# Patient Record
Sex: FEMALE | Race: WHITE | NOT HISPANIC OR LATINO | URBAN - METROPOLITAN AREA
[De-identification: names, ages, dates, MRNs, and addresses within clinical notes are randomized per-mention and may not be internally consistent; named-entity substitution may affect disease eponyms.]

---

## 2018-04-22 ENCOUNTER — EMERGENCY (EMERGENCY)
Facility: HOSPITAL | Age: 58
LOS: 1 days | Discharge: HOME | End: 2018-04-22
Attending: EMERGENCY MEDICINE
Payer: COMMERCIAL

## 2018-04-22 VITALS
OXYGEN SATURATION: 100 % | DIASTOLIC BLOOD PRESSURE: 60 MMHG | SYSTOLIC BLOOD PRESSURE: 160 MMHG | HEART RATE: 55 BPM | RESPIRATION RATE: 18 BRPM

## 2018-04-22 VITALS
RESPIRATION RATE: 16 BRPM | WEIGHT: 141.98 LBS | OXYGEN SATURATION: 100 % | HEIGHT: 62 IN | HEART RATE: 99 BPM | SYSTOLIC BLOOD PRESSURE: 161 MMHG | DIASTOLIC BLOOD PRESSURE: 61 MMHG | TEMPERATURE: 98 F

## 2018-04-22 PROCEDURE — 99283 EMERGENCY DEPT VISIT LOW MDM: CPT

## 2018-04-22 RX ORDER — HYDROCORTISONE 1 %
1 OINTMENT (GRAM) TOPICAL ONCE
Qty: 0 | Refills: 0 | Status: COMPLETED | OUTPATIENT
Start: 2018-04-22 | End: 2018-04-22

## 2018-04-22 RX ADMIN — Medication 1 APPLICATION(S): at 11:56

## 2018-04-22 NOTE — ED PROVIDER NOTE - LEFT FACE
periorbital erythema and swelling b/l, l>r, both supra- and infraorbital, but sparing area just underneath eyes b/l

## 2018-04-22 NOTE — ED PROVIDER NOTE - SKIN RASH DESCRIPTION
RAISED/REDDENED/firm, non-tender, non0--blanching erythema periorbitally on both sides, w scant overlying scale

## 2018-04-22 NOTE — ED PROVIDER NOTE - OBJECTIVE STATEMENT
59yo f w hx HTN, thyroid ca s/p partial thyroidectomy, here c/o 1 month rash around eyes. Pt says rash has been intermittent since onset. Not painful or pruritic. No fever. No eye pain, itchiness, or discharge. No sinus pressure, rhinorrhea, sore throat. Has also noted concomitant dryness of lips. No eye dryness. No known triggers. Pt recently went back to work at [employer] job and started wearing glasses made of plastic in same time frame as sx. Denies new products on face. Tried benadryl once yesterday w no relief. No new medications. No recent sun exposure. Has no SOB or GI sx. No muscle weakness, joint pain, or rash elsewhere. Has not seen any MD for this issue as of yet.

## 2018-04-22 NOTE — ED PROVIDER NOTE - MEDICAL DECISION MAKING DETAILS
58f here c/o 1 mo non-painful, non-pruritic periorbital rash w/o fever, intraocular, or URI sx. Low susp for periorbital cellulitis. Sx may be 2/2 contact dermatitis given recent introduction of glasses in similar timeframe, vs dermatomyositis or other rheum condition given distribution. Will give topical steroids and dc w rheum and derm f/u

## 2018-04-22 NOTE — ED PROVIDER NOTE - ATTENDING CONTRIBUTION TO CARE
Attending Note (Antonio): patient complaining of bilateral erythema surrounding both eyes. no vision changes.  erythema infra and supra orbital.  concern for inflammatory process. trial of topical hydrocortisone and outpatient rheum follow up

## 2018-04-22 NOTE — ED PROVIDER NOTE - PLAN OF CARE
You were seen in the emergency department for rash around your eyes. Please follow up with a rheumatologist and a dermatologist (contact info provided) in the next 5-7 days. Apply hydrocortisone cream to the affected area, making sure to avoid contact with eyes. Return to the emergency department immediately if you experience fever, pain w eye movements, headache, or any other concerning symptoms.

## 2018-04-22 NOTE — ED PROVIDER NOTE - CARE PLAN
Principal Discharge DX:	Rash  Assessment and plan of treatment:	You were seen in the emergency department for rash around your eyes. Please follow up with a rheumatologist and a dermatologist (contact info provided) in the next 5-7 days. Apply hydrocortisone cream to the affected area, making sure to avoid contact with eyes. Return to the emergency department immediately if you experience fever, pain w eye movements, headache, or any other concerning symptoms.

## 2019-05-03 ENCOUNTER — RESULT REVIEW (OUTPATIENT)
Age: 59
End: 2019-05-03

## 2020-08-02 ENCOUNTER — INPATIENT (INPATIENT)
Facility: HOSPITAL | Age: 60
LOS: 1 days | Discharge: HOME | End: 2020-08-04
Attending: INTERNAL MEDICINE | Admitting: INTERNAL MEDICINE
Payer: COMMERCIAL

## 2020-08-02 VITALS — HEART RATE: 29 BPM | RESPIRATION RATE: 16 BRPM

## 2020-08-02 DIAGNOSIS — E03.9 HYPOTHYROIDISM, UNSPECIFIED: ICD-10-CM

## 2020-08-02 DIAGNOSIS — I10 ESSENTIAL (PRIMARY) HYPERTENSION: ICD-10-CM

## 2020-08-02 DIAGNOSIS — I44.2 ATRIOVENTRICULAR BLOCK, COMPLETE: ICD-10-CM

## 2020-08-02 DIAGNOSIS — I45.9 CONDUCTION DISORDER, UNSPECIFIED: ICD-10-CM

## 2020-08-02 LAB
ALBUMIN SERPL ELPH-MCNC: 4.6 G/DL — SIGNIFICANT CHANGE UP (ref 3.3–5)
ALP SERPL-CCNC: 86 U/L — SIGNIFICANT CHANGE UP (ref 40–120)
ALT FLD-CCNC: 50 U/L — HIGH (ref 4–33)
ANION GAP SERPL CALC-SCNC: 20 MMO/L — HIGH (ref 7–14)
AST SERPL-CCNC: 33 U/L — HIGH (ref 4–32)
BASE EXCESS BLDV CALC-SCNC: -2.7 MMOL/L — SIGNIFICANT CHANGE UP
BASOPHILS # BLD AUTO: 0.06 K/UL — SIGNIFICANT CHANGE UP (ref 0–0.2)
BASOPHILS NFR BLD AUTO: 0.6 % — SIGNIFICANT CHANGE UP (ref 0–2)
BILIRUB SERPL-MCNC: 0.9 MG/DL — SIGNIFICANT CHANGE UP (ref 0.2–1.2)
BUN SERPL-MCNC: 20 MG/DL — SIGNIFICANT CHANGE UP (ref 7–23)
CALCIUM SERPL-MCNC: 9.4 MG/DL — SIGNIFICANT CHANGE UP (ref 8.4–10.5)
CHLORIDE SERPL-SCNC: 101 MMOL/L — SIGNIFICANT CHANGE UP (ref 98–107)
CO2 SERPL-SCNC: 17 MMOL/L — LOW (ref 22–31)
CREAT SERPL-MCNC: 1.01 MG/DL — SIGNIFICANT CHANGE UP (ref 0.5–1.3)
EOSINOPHIL # BLD AUTO: 0.13 K/UL — SIGNIFICANT CHANGE UP (ref 0–0.5)
EOSINOPHIL NFR BLD AUTO: 1.3 % — SIGNIFICANT CHANGE UP (ref 0–6)
GAS PNL BLDV: 140 MMOL/L — SIGNIFICANT CHANGE UP (ref 136–146)
GLUCOSE BLDV-MCNC: 165 MG/DL — HIGH (ref 70–99)
GLUCOSE SERPL-MCNC: 172 MG/DL — HIGH (ref 70–99)
HCO3 BLDV-SCNC: 22 MMOL/L — SIGNIFICANT CHANGE UP (ref 20–27)
HCT VFR BLD CALC: 39.9 % — SIGNIFICANT CHANGE UP (ref 34.5–45)
HCT VFR BLDV CALC: 43.5 % — SIGNIFICANT CHANGE UP (ref 34.5–45)
HGB BLD-MCNC: 13.6 G/DL — SIGNIFICANT CHANGE UP (ref 11.5–15.5)
HGB BLDV-MCNC: 14.2 G/DL — SIGNIFICANT CHANGE UP (ref 11.5–15.5)
IMM GRANULOCYTES NFR BLD AUTO: 0.5 % — SIGNIFICANT CHANGE UP (ref 0–1.5)
LYMPHOCYTES # BLD AUTO: 1.71 K/UL — SIGNIFICANT CHANGE UP (ref 1–3.3)
LYMPHOCYTES # BLD AUTO: 16.9 % — SIGNIFICANT CHANGE UP (ref 13–44)
MAGNESIUM SERPL-MCNC: 2.1 MG/DL — SIGNIFICANT CHANGE UP (ref 1.6–2.6)
MCHC RBC-ENTMCNC: 29.5 PG — SIGNIFICANT CHANGE UP (ref 27–34)
MCHC RBC-ENTMCNC: 34.1 % — SIGNIFICANT CHANGE UP (ref 32–36)
MCV RBC AUTO: 86.6 FL — SIGNIFICANT CHANGE UP (ref 80–100)
MONOCYTES # BLD AUTO: 0.68 K/UL — SIGNIFICANT CHANGE UP (ref 0–0.9)
MONOCYTES NFR BLD AUTO: 6.7 % — SIGNIFICANT CHANGE UP (ref 2–14)
NEUTROPHILS # BLD AUTO: 7.5 K/UL — HIGH (ref 1.8–7.4)
NEUTROPHILS NFR BLD AUTO: 74 % — SIGNIFICANT CHANGE UP (ref 43–77)
NRBC # FLD: 0 K/UL — SIGNIFICANT CHANGE UP (ref 0–0)
PCO2 BLDV: 28 MMHG — LOW (ref 41–51)
PH BLDV: 7.48 PH — HIGH (ref 7.32–7.43)
PHOSPHATE SERPL-MCNC: 2.1 MG/DL — LOW (ref 2.5–4.5)
PLATELET # BLD AUTO: 289 K/UL — SIGNIFICANT CHANGE UP (ref 150–400)
PMV BLD: 10.3 FL — SIGNIFICANT CHANGE UP (ref 7–13)
PO2 BLDV: 27 MMHG — LOW (ref 35–40)
POTASSIUM BLDV-SCNC: 3.7 MMOL/L — SIGNIFICANT CHANGE UP (ref 3.4–4.5)
POTASSIUM SERPL-MCNC: 3.6 MMOL/L — SIGNIFICANT CHANGE UP (ref 3.5–5.3)
POTASSIUM SERPL-SCNC: 3.6 MMOL/L — SIGNIFICANT CHANGE UP (ref 3.5–5.3)
PROT SERPL-MCNC: 6.8 G/DL — SIGNIFICANT CHANGE UP (ref 6–8.3)
RBC # BLD: 4.61 M/UL — SIGNIFICANT CHANGE UP (ref 3.8–5.2)
RBC # FLD: 12.7 % — SIGNIFICANT CHANGE UP (ref 10.3–14.5)
SAO2 % BLDV: 54.5 % — LOW (ref 60–85)
SARS-COV-2 RNA SPEC QL NAA+PROBE: SIGNIFICANT CHANGE UP
SODIUM SERPL-SCNC: 138 MMOL/L — SIGNIFICANT CHANGE UP (ref 135–145)
TROPONIN T, HIGH SENSITIVITY: 37 NG/L — SIGNIFICANT CHANGE UP (ref ?–14)
TSH SERPL-MCNC: 1.48 UIU/ML — SIGNIFICANT CHANGE UP (ref 0.27–4.2)
WBC # BLD: 10.13 K/UL — SIGNIFICANT CHANGE UP (ref 3.8–10.5)
WBC # FLD AUTO: 10.13 K/UL — SIGNIFICANT CHANGE UP (ref 3.8–10.5)

## 2020-08-02 PROCEDURE — 99291 CRITICAL CARE FIRST HOUR: CPT

## 2020-08-02 PROCEDURE — 71045 X-RAY EXAM CHEST 1 VIEW: CPT | Mod: 26

## 2020-08-02 RX ORDER — POTASSIUM CHLORIDE 20 MEQ
40 PACKET (EA) ORAL ONCE
Refills: 0 | Status: COMPLETED | OUTPATIENT
Start: 2020-08-02 | End: 2020-08-02

## 2020-08-02 RX ORDER — POTASSIUM PHOSPHATE, MONOBASIC POTASSIUM PHOSPHATE, DIBASIC 236; 224 MG/ML; MG/ML
15 INJECTION, SOLUTION INTRAVENOUS ONCE
Refills: 0 | Status: COMPLETED | OUTPATIENT
Start: 2020-08-02 | End: 2020-08-02

## 2020-08-02 RX ORDER — RAMIPRIL 5 MG
1 CAPSULE ORAL
Qty: 0 | Refills: 0 | DISCHARGE

## 2020-08-02 RX ORDER — CHLORHEXIDINE GLUCONATE 213 G/1000ML
1 SOLUTION TOPICAL DAILY
Refills: 0 | Status: DISCONTINUED | OUTPATIENT
Start: 2020-08-02 | End: 2020-08-04

## 2020-08-02 RX ORDER — LEVOTHYROXINE SODIUM 125 MCG
1 TABLET ORAL
Qty: 0 | Refills: 0 | DISCHARGE

## 2020-08-02 RX ORDER — MAGNESIUM SULFATE 500 MG/ML
2 VIAL (ML) INJECTION ONCE
Refills: 0 | Status: COMPLETED | OUTPATIENT
Start: 2020-08-02 | End: 2020-08-02

## 2020-08-02 RX ORDER — METOCLOPRAMIDE HCL 10 MG
10 TABLET ORAL ONCE
Refills: 0 | Status: COMPLETED | OUTPATIENT
Start: 2020-08-02 | End: 2020-08-02

## 2020-08-02 RX ADMIN — Medication 40 MILLIEQUIVALENT(S): at 22:09

## 2020-08-02 RX ADMIN — Medication 50 GRAM(S): at 18:54

## 2020-08-02 RX ADMIN — POTASSIUM PHOSPHATE, MONOBASIC POTASSIUM PHOSPHATE, DIBASIC 62.5 MILLIMOLE(S): 236; 224 INJECTION, SOLUTION INTRAVENOUS at 22:09

## 2020-08-02 RX ADMIN — Medication 10 MILLIGRAM(S): at 19:26

## 2020-08-02 NOTE — H&P ADULT - HISTORY OF PRESENT ILLNESS
Patient is a 59 yo F with PMH of complete L bundle branch block, HTN and hypothyroid presenting with 1 day of SOB and dizzyness. Patient states that yesterday she was feeling fatigued after a walk. This morning, patient reports having headaches. In the early afternoon patient began feeling light headed and dizzy, nauseous, and had first episode of NBNB vomiting and diarrhea. Patient's  reported that she fell asleep and had trouble waking her up. Patient began making gurgling sounds and "seizure like motions" and had another episode of diarrhea and vomit. Currently, the patient endorses SOB and "shakiness."    In the ED vital signs were HR 29, RR 16. Patient was given magnesium sulfate IVPB 2 g.

## 2020-08-02 NOTE — H&P ADULT - NSHPPHYSICALEXAM_GEN_ALL_CORE
VITAL SIGNS: I have reviewed nursing notes and confirm.  CONSTITUTIONAL: Well-developed; well-nourished; somnolent, in no acute distress  SKIN: Skin exam is warm and dry, no acute rash.  HEAD: Normocephalic; atraumatic.  EYES: PERRL, EOM intact; conjunctiva and sclera clear.  ENT: No nasal discharge; airway clear. TMs clear.  NECK: Supple; non tender.  CARD: complete heart block with PVC and episodes of VT in emergency room   RESP: No wheezes, rales or rhonchi.  ABD: Normal bowel sounds; soft; non-distended; non-tender; no hepatosplenomegaly.  EXT: Normal ROM. No clubbing, cyanosis or edema.  LYMPH: No acute cervical adenopathy.  NEURO: Alert, oriented. Grossly unremarkable. No focal deficits.  PSYCH: Cooperative, appropriate.

## 2020-08-02 NOTE — H&P ADULT - NSICDXPASTMEDICALHX_GEN_ALL_CORE_FT
PAST MEDICAL HISTORY:  H/O left bundle branch block diagnosed 7-10 years ago    Hypertension     Hypothyroid

## 2020-08-02 NOTE — H&P ADULT - NSHPLABSRESULTS_GEN_ALL_CORE
LABS: Personally reviewed labs, imaging, and ECG                          13.6   10.13 )-----------( 289      ( 02 Aug 2020 17:35 )             39.9       08-02    138  |  101  |  20  ----------------------------<  172<H>  3.6   |  17<L>  |  1.01    Ca    9.4      02 Aug 2020 17:35  Phos  2.1     08-02  Mg     2.1     08-02    TPro  6.8  /  Alb  4.6  /  TBili  0.9  /  DBili  x   /  AST  33<H>  /  ALT  50<H>  /  AlkPhos  86  08-02       LIVER FUNCTIONS - ( 02 Aug 2020 17:35 )  Alb: 4.6 g/dL / Pro: 6.8 g/dL / ALK PHOS: 86 u/L / ALT: 50 u/L / AST: 33 u/L / GGT: x                            Lactate Trend            CAPILLARY BLOOD GLUCOSE                RADIOLOGY & ADDITIONAL TESTS:

## 2020-08-02 NOTE — H&P ADULT - ASSESSMENT
60 year old female with PMH of L bundle branch block, HTN, and hypothyroid diagnosed with complete bundle branch block in ED.

## 2020-08-02 NOTE — ED PROVIDER NOTE - PHYSICAL EXAMINATION
Gen: diaphoretic, nauseous  Head: NCAT  HEENT: PERRL, MMM, normal conjunctiva, anicteric, neck supple  Lung: CTAB, no adventitious sounds  CV: bradycardic  Abd: soft, NTND, no rebound or guarding, no CVAT  MSK: No edema, no visible deformities  Neuro: No focal neurologic deficits. CN II-XII grossly intact. 5/5 strength and normal sensation in all extremities.  Skin: Warm and dry, no evidence of rash  Psych: normal mood and affect

## 2020-08-02 NOTE — H&P ADULT - PROBLEM SELECTOR PLAN 1
- s/p transvenous pacing wire placement in emergency room, setting rate of 60, MA 10, threshold of 0.8  - NPO past MN  - stat TTE in am  - likely BiV pacemaker

## 2020-08-02 NOTE — H&P ADULT - NSHPREVIEWOFSYSTEMS_GEN_ALL_CORE
CONSTITUTIONAL: +chills, No weakness, no fevers   EYES/ENT: No visual changes;  No vertigo or throat pain   NECK: No pain or stiffness  RESPIRATORY: + SOB, No cough, wheezing, hemoptysis  CARDIOVASCULAR: No chest pain or palpitations  GASTROINTESTINAL: +N/V/D, No abdominal pain; No hemetemesis, melena or hematochezia.  GENITOURINARY: No dysuria, frequency or hematuria  NEUROLOGICAL: +headache, +dizzyness, No numbness or weakness  SKIN: No itching, burning, rashes, or lesions   All other review of systems is negative unless indicated above.

## 2020-08-02 NOTE — ED PROVIDER NOTE - OBJECTIVE STATEMENT
61yo F with hx of heart block, bradycardia presents today with dizziness. Had syncopal event. Pt too dizzy to give accurate history.

## 2020-08-02 NOTE — ED ADULT NURSE REASSESSMENT NOTE - NS ED NURSE REASSESS COMMENT FT1
MD Corona/Darrell @ bedside for pacemaker placement, Natividad (rn Fac) @ bedside, pt remains on zoll/cm for monitoring, VS as noted, awaiting CCU ADM/bed, will continue to monitor.

## 2020-08-02 NOTE — ED PROVIDER NOTE - ATTENDING CONTRIBUTION TO CARE
Awake, Alert, Conversant.  Seated in stretcher.  Breath sounds clear in all lung fields.  Bradycardic regular heart rate without murmurs, rubs, or gallops.  Normal S1/S2.  Cool extremities.  Abdomen soft and nontender.  No lower extremity swelling or tenderness.  Oriented and conversant with fluent speech, moving all extremities with good strength.    Dr. Palma: I agree with the above provided history and exam and addend/modify it as follows.  60F hx Hypertension, S/P thyroidectomy, known left bundle branch block Presents with nausea, vomiting (non-bloody/non-bilious) and generalized weakness onset at 2-3 pm constant since onset.  No clear provoking or palliating factors. No chest pain.  No bloody or melenic stools.  No recent fever or chills.  On arrival on complete heart block with hypotension, and cool extremities consistent with cardiogenic shock in the setting of bradycardia.  EKG demonstrates known LBBB, negative by Sgarbossa's criteria.  Telemetry demonstrates runs of NSVT consistent with Joey T phenomenon. Given evidence of shock will place TVP.  No recent rashes or travel but lyme disease is considered.  No evidence of other infectious etiology.  Possible metabolic derangement, hypothyroidism or electrolyte abnormality.  R/O MI.    Plan for labs, TVP, CXR, cardiology consulted and at bedside.  Anticipate [facility] admission.    DEMI Palma MD performed a history and physical exam of the patient and discussed their management with the resident and /or advanced care provider. I reviewed the resident and /or ACP's note and agree with the documented findings and plan of care. My medical decision making and observations are found above.  I spent 34 minutes of critical care time excluding procedures on the evaluation, decision-making, and management of this patient and personally reviewed pertinent imaging, labs, and supporting documentation.

## 2020-08-02 NOTE — ED PROCEDURE NOTE - ATTENDING CONTRIBUTION TO CARE
DEMI Palam MD was present for the entirety of the procedure which was tell tolerated and without complications.

## 2020-08-03 ENCOUNTER — TRANSCRIPTION ENCOUNTER (OUTPATIENT)
Age: 60
End: 2020-08-03

## 2020-08-03 DIAGNOSIS — Z98.890 OTHER SPECIFIED POSTPROCEDURAL STATES: Chronic | ICD-10-CM

## 2020-08-03 PROBLEM — I10 ESSENTIAL (PRIMARY) HYPERTENSION: Chronic | Status: ACTIVE | Noted: 2018-04-22

## 2020-08-03 LAB
ALBUMIN SERPL ELPH-MCNC: 4.5 G/DL — SIGNIFICANT CHANGE UP (ref 3.3–5)
ALP SERPL-CCNC: 93 U/L — SIGNIFICANT CHANGE UP (ref 40–120)
ALT FLD-CCNC: 43 U/L — HIGH (ref 4–33)
ANION GAP SERPL CALC-SCNC: 17 MMO/L — HIGH (ref 7–14)
APTT BLD: 30.2 SEC — SIGNIFICANT CHANGE UP (ref 27–36.3)
AST SERPL-CCNC: 32 U/L — SIGNIFICANT CHANGE UP (ref 4–32)
BILIRUB SERPL-MCNC: 1.3 MG/DL — HIGH (ref 0.2–1.2)
BUN SERPL-MCNC: 14 MG/DL — SIGNIFICANT CHANGE UP (ref 7–23)
CALCIUM SERPL-MCNC: 9.7 MG/DL — SIGNIFICANT CHANGE UP (ref 8.4–10.5)
CHLORIDE SERPL-SCNC: 103 MMOL/L — SIGNIFICANT CHANGE UP (ref 98–107)
CO2 SERPL-SCNC: 19 MMOL/L — LOW (ref 22–31)
CREAT SERPL-MCNC: 0.72 MG/DL — SIGNIFICANT CHANGE UP (ref 0.5–1.3)
GLUCOSE SERPL-MCNC: 114 MG/DL — HIGH (ref 70–99)
HCT VFR BLD CALC: 45.5 % — HIGH (ref 34.5–45)
HGB BLD-MCNC: 14.9 G/DL — SIGNIFICANT CHANGE UP (ref 11.5–15.5)
INR BLD: 1.07 — SIGNIFICANT CHANGE UP (ref 0.88–1.17)
MAGNESIUM SERPL-MCNC: 2.6 MG/DL — SIGNIFICANT CHANGE UP (ref 1.6–2.6)
MCHC RBC-ENTMCNC: 28.7 PG — SIGNIFICANT CHANGE UP (ref 27–34)
MCHC RBC-ENTMCNC: 32.7 % — SIGNIFICANT CHANGE UP (ref 32–36)
MCV RBC AUTO: 87.5 FL — SIGNIFICANT CHANGE UP (ref 80–100)
NRBC # FLD: 0 K/UL — SIGNIFICANT CHANGE UP (ref 0–0)
PHOSPHATE SERPL-MCNC: 3.5 MG/DL — SIGNIFICANT CHANGE UP (ref 2.5–4.5)
PLATELET # BLD AUTO: 269 K/UL — SIGNIFICANT CHANGE UP (ref 150–400)
PMV BLD: 9.8 FL — SIGNIFICANT CHANGE UP (ref 7–13)
POTASSIUM SERPL-MCNC: 4.4 MMOL/L — SIGNIFICANT CHANGE UP (ref 3.5–5.3)
POTASSIUM SERPL-SCNC: 4.4 MMOL/L — SIGNIFICANT CHANGE UP (ref 3.5–5.3)
PROT SERPL-MCNC: 7.3 G/DL — SIGNIFICANT CHANGE UP (ref 6–8.3)
PROTHROM AB SERPL-ACNC: 12.3 SEC — SIGNIFICANT CHANGE UP (ref 9.8–13.1)
RBC # BLD: 5.2 M/UL — SIGNIFICANT CHANGE UP (ref 3.8–5.2)
RBC # FLD: 12.6 % — SIGNIFICANT CHANGE UP (ref 10.3–14.5)
SODIUM SERPL-SCNC: 139 MMOL/L — SIGNIFICANT CHANGE UP (ref 135–145)
TROPONIN T, HIGH SENSITIVITY: 71 NG/L — CRITICAL HIGH (ref ?–14)
WBC # BLD: 11.18 K/UL — HIGH (ref 3.8–10.5)
WBC # FLD AUTO: 11.18 K/UL — HIGH (ref 3.8–10.5)

## 2020-08-03 PROCEDURE — 71045 X-RAY EXAM CHEST 1 VIEW: CPT | Mod: 26

## 2020-08-03 PROCEDURE — 93306 TTE W/DOPPLER COMPLETE: CPT | Mod: 26

## 2020-08-03 PROCEDURE — 93454 CORONARY ARTERY ANGIO S&I: CPT | Mod: 26

## 2020-08-03 PROCEDURE — 33225 L VENTRIC PACING LEAD ADD-ON: CPT

## 2020-08-03 PROCEDURE — 33208 INSRT HEART PM ATRIAL & VENT: CPT

## 2020-08-03 PROCEDURE — 99233 SBSQ HOSP IP/OBS HIGH 50: CPT

## 2020-08-03 RX ORDER — VANCOMYCIN HCL 1 G
1000 VIAL (EA) INTRAVENOUS ONCE
Refills: 0 | Status: COMPLETED | OUTPATIENT
Start: 2020-08-04 | End: 2020-08-04

## 2020-08-03 RX ADMIN — CHLORHEXIDINE GLUCONATE 1 APPLICATION(S): 213 SOLUTION TOPICAL at 11:27

## 2020-08-03 NOTE — DISCHARGE NOTE PROVIDER - CARE PROVIDER_API CALL
Luis Nick  CARDIAC ELECTROPHYSIOLOGY  [address] [place] [address]  Phone: [phone]  Fax: [phone]  Follow Up Time: Luis Nick  CARDIAC ELECTROPHYSIOLOGY  [address] [address] [place] [id number]  Phone: [phone]  Fax: [phone]  Follow Up Time:     Willard Baig  CARDIOVASCULAR DISEASE  [address] [address]  Phone: [phone]  Fax: [phone]  Established Patient  Follow Up Time: 1 week

## 2020-08-03 NOTE — DISCHARGE NOTE PROVIDER - CARE PROVIDERS DIRECT ADDRESSES
[email or web address]

## 2020-08-03 NOTE — DISCHARGE NOTE PROVIDER - NSDCMRMEDTOKEN_GEN_ALL_CORE_FT
ramipril 2.5 mg oral tablet: 1  orally  Synthroid 50 mcg (0.05 mg) oral tablet: 1 tab(s) orally once a day carvedilol 3.125 mg oral tablet: 1 tab(s) orally every 12 hours  ramipril 2.5 mg oral tablet: 1  orally  Synthroid 50 mcg (0.05 mg) oral tablet: 1 tab(s) orally once a day

## 2020-08-03 NOTE — DISCHARGE NOTE PROVIDER - HOSPITAL COURSE
Mrs. Borjas is a 61 yo F w/ PMH of LBBB (diagnosed 10 years ago), hypertension, and hypothyroid (s/p thyroid surgery); p/w worsening fatigue, N/V/D, SOB, and bradycardia (29 admitted with CHB with wide and slow escape rhythm. In ER, a transvenous pacing wire was inserted VVIof 60. Patient is awaiting a permanent pacemker.     She had a rise in troponin that is being closely monitored. Mrs. Borjas is a 61 yo F w/ PMH of LBBB (diagnosed 10 years ago), hypertension, and hypothyroid (s/p thyroid surgery); p/w worsening fatigue, N/V/D, SOB, and bradycardia (29 admitted with CHB with wide and slow escape rhythm. In ER, a transvenous pacing wire was inserted VVIof 60. Ms. Borjas underwent a TTE which was notable for mild segmental LV dysfunction with hypokinesia of the inferior wall (EF 50%), concerning as this may place patient at a predisposition toward heart failure. Ms. Borjas may benefit from a follow-up echocardiogram for further assessment. On the day of discharge, patient was started on carvedilol 3.125 q12, which she tolerated well.        Cardiac catheterization revealed NCA. S/P BIVPM implantation yesterday, without complication. Mrs. Borjas is a 61 yo F w/ PMH of LBBB (diagnosed 10 years ago), hypertension, and hypothyroid (s/p thyroid surgery); p/w worsening fatigue, N/V/D, SOB, and bradycardia (29 admitted with CHB with wide and slow escape rhythm. In ER, a transvenous pacing wire was inserted VVIof 60. Ms. Borjas underwent a TTE which was notable for mild segmental LV dysfunction with hypokinesia of the inferior wall (EF 50%), concerning as this may place patient at a predisposition toward heart failure. Ms. Borjas may benefit from a follow-up echocardiogram for further assessment. On the day of discharge, patient was started on carvedilol 3.125 q12, which was well tolerated. Cardiac catheterization revealed NCA. Ms Borjas underwent BIVPM implantation on 8/3/2020, without complication. Of note, Ms. Borjas's troponin was elevated on 8/3/2020 at 71 (from 37). At discharge on 8/4/2020, troponin was 46.        On day of discharge, Ms. Borjas had no complaints (other than residual pain at incision site), her vital signs were stable, and labs overall stable and not concerning.

## 2020-08-03 NOTE — CONSULT NOTE ADULT - ASSESSMENT
61 yo F w/ PMH of LBBB (diagnosed 10 years ago), hypertension, and hypothyroid (s/p thyroid surgery); p/w worsening fatigue, N/V/D, SOB, and bradycardia (29 bpm, s/p TVP); admitted with CHB with wide and slow escape rhythm.    #CHB  -known LBBB, admitted w/ CHB  -s/p transvenous pacing wire placement in ED (rate 60, MA 10, threshold 0.8)  -planning for BIV pacemaker this afternoon. Patient consented for the procedure after understanding risks/benefits and alternatives of theprocedure      #Elevated troponin   -71 today (from 37)  - Echocardiogram prelim report shows mild LV dysfunction and inferior wall hypokinesis  - Plan for possible cardiac catheterization prior to PPM placement  - Discussed with Dr. Nick

## 2020-08-03 NOTE — CONSULT NOTE ADULT - ATTENDING COMMENTS
61 yo F w/ PMH of LBBB (diagnosed 10 years ago), hypertension, and hypothyroid (s/p thyroid surgery); p/w worsening fatigue, N/V/D, SOB, and bradycardia (29 bpm, s/p TVP); admitted with CHB with LBBB- wide and slow escape rhythm. Temp wire in place. EF: 50%, inf hypokinesis- plan for cath and BiV PPM.

## 2020-08-03 NOTE — CONSULT NOTE ADULT - SUBJECTIVE AND OBJECTIVE BOX
CHIEF COMPLAINT: Called to evaluate patient with complete heart block for PPM implantation    HISTORY OF PRESENT ILLNESS:  Patient is a 61 yo F with PMH of L bundle branch block, HTN and hypothyroidism presented with 1 day of SOB and dizziness. Patient reports feeling fatigued after a walk yesterday. She also had c/o headaches. In the early afternoon patient began feeling light headed and dizzy, nauseous, and had first episode of NBNB vomiting and diarrhea. Patient's  reported that she fell asleep and had trouble waking her up. Patient began making gurgling sounds and "seizure like motions" and had another episode of diarrhea and vomit. Patient was found to have HR 29. Patient was given magnesium sulfate IVPB 2 g. EKG revealed CHB with wide and slow escape rhythm and TVP was placed. Currently telemetry reveals sinus rhythm/junctional rhythm with V pacing and patient denies any chest pain, SOB, palpitations or dizziness.       PAST MEDICAL & SURGICAL HISTORY:  H/O left bundle branch block: diagnosed 7-10 years ago  Hypothyroid  Hypertension  History of thyroid surgery: Patient suffers from nausea with anesthesia    PREVIOUS DIAGNOSTIC TESTING:    Echocardiogram:   Catheterization:  Stress Test:  	    MEDICATIONS:              chlorhexidine 4% Liquid 1 Application(s) Topical daily      FAMILY HISTORY:      SOCIAL HISTORY:      [ ] Smoker  [ ] Alcohol  [ ] Drugs    Allergies    Keflex (Nausea)  penicillin (Rash)    Intolerances    	    REVIEW OF SYSTEMS:  CONSTITUTIONAL: No fever, weight loss, or fatigue  EYES: No eye pain, visual disturbances, or discharge  ENMT:  No difficulty hearing, tinnitus, vertigo; No sinus or throat pain  NECK: No pain or stiffness  RESPIRATORY: No cough, wheezing, chills or hemoptysis; No Shortness of Breath  CARDIOVASCULAR: No chest pain, palpitations, passing out, dizziness, or leg swelling  GASTROINTESTINAL: No abdominal or epigastric pain. No nausea, vomiting, or hematemesis; No diarrhea or constipation. No melena or hematochezia.  GENITOURINARY: No dysuria, frequency, hematuria, or incontinence  NEUROLOGICAL: No headaches, memory loss, loss of strength, numbness, or tremors  SKIN: No itching, burning, rashes, or lesions   LYMPH Nodes: No enlarged glands  ENDOCRINE: No heat or cold intolerance; No hair loss  MUSCULOSKELETAL: No joint pain or swelling; No muscle, back, or extremity pain  PSYCHIATRIC: No depression, anxiety, mood swings, or difficulty sleeping  HEME/LYMPH: No easy bruising, or bleeding gums  ALLERY AND IMMUNOLOGIC: No hives or eczema	    [ ] All others negative	  [ ] Unable to obtain    PHYSICAL EXAM:  T(C): 36.8 (08-03-20 @ 07:55), Max: 36.8 (08-03-20 @ 07:55)  HR: 65 (08-03-20 @ 12:00) (29 - 73)  BP: 146/68 (08-03-20 @ 12:00) (112/90 - 177/70)  RR: 17 (08-03-20 @ 12:00) (14 - 22)  SpO2: 100% (08-03-20 @ 12:00) (98% - 100%)  Wt(kg): --  I&O's Summary    02 Aug 2020 07:01  -  03 Aug 2020 07:00  --------------------------------------------------------  IN: 250 mL / OUT: 2450 mL / NET: -2200 mL    03 Aug 2020 07:01  -  03 Aug 2020 13:42  --------------------------------------------------------  IN: 0 mL / OUT: 0 mL / NET: 0 mL        Appearance: Normal	  HEENT:   Normal oral mucosa, PERRL, EOMI	  Lymphatic: No lymphadenopathy  Cardiovascular: Normal S1 S2, No JVD, No murmurs, No edema  Respiratory: Lungs clear to auscultation	  Psychiatry: A & O x 3, Mood & affect appropriate  Gastrointestinal:  Soft, Non-tender, + BS	  Skin: No rashes, No ecchymoses, No cyanosis	  Neurologic: Non-focal  Extremities: Normal range of motion, No clubbing, cyanosis or edema  Vascular: Peripheral pulses palpable 2+ bilaterally    TELEMETRY: 	    ECG:  	  RADIOLOGY:  OTHER: 	  	  LABS:	 	    CARDIAC MARKERS:                                  14.9   11.18 )-----------( 269      ( 03 Aug 2020 04:00 )             45.5     08-03    139  |  103  |  14  ----------------------------<  114<H>  4.4   |  19<L>  |  0.72    Ca    9.7      03 Aug 2020 04:00  Phos  3.5     08-03  Mg     2.6     08-03    TPro  7.3  /  Alb  4.5  /  TBili  1.3<H>  /  DBili  x   /  AST  32  /  ALT  43<H>  /  AlkPhos  93  08-03    proBNP:   Lipid Profile:   HgA1c:   TSH: Thyroid Stimulating Hormone, Serum: 1.48 uIU/mL (08-02 @ 17:49) CHIEF COMPLAINT: Called to evaluate patient with complete heart block for PPM implantation    HISTORY OF PRESENT ILLNESS:  Patient is a 61 yo F with PMH of L bundle branch block, HTN and hypothyroidism presented with 1 day of SOB and dizziness. Patient reports feeling fatigued after a walk yesterday. She also had c/o headaches. In the early afternoon patient began feeling light headed and dizzy, nauseous, and had first episode of NBNB vomiting and diarrhea. Patient's  reported that she fell asleep and had trouble waking her up. Patient began making gurgling sounds and "seizure like motions" and had another episode of diarrhea and vomit. Patient was found to have HR 29. Patient was given magnesium sulfate IVPB 2 g. EKG revealed CHB with wide and slow escape rhythm and TVP was placed. Currently telemetry reveals sinus rhythm/junctional rhythm with V pacing and patient denies any chest pain, SOB, palpitations or dizziness.       PAST MEDICAL & SURGICAL HISTORY:  H/O left bundle branch block: diagnosed 7-10 years ago  Hypothyroid  Hypertension  History of thyroid surgery: Patient suffers from nausea with anesthesia    PREVIOUS DIAGNOSTIC TESTING:    Echocardiogram: Prelim report: Inferior wall hypokinesis, mild LV dysfunction   Catheterization: Pending  	    MEDICATIONS:  chlorhexidine 4% Liquid 1 Application(s) Topical daily      FAMILY HISTORY:  None significant      SOCIAL HISTORY:      [-] Smoker  [-] Alcohol  [-] Drugs    Allergies    Keflex (Nausea)  penicillin (Rash)    Intolerances    	    REVIEW OF SYSTEMS:  CONSTITUTIONAL: No fever, weight loss, or fatigue  EYES: No eye pain, visual disturbances, or discharge  ENMT:  No difficulty hearing, tinnitus, vertigo; No sinus or throat pain  NECK: No pain or stiffness  RESPIRATORY: No cough, wheezing, chills or hemoptysis; + Shortness of Breath  CARDIOVASCULAR: No chest pain, palpitations, passing out, +dizziness  GASTROINTESTINAL: No abdominal or epigastric pain. No nausea, vomiting, or hematemesis; No diarrhea or constipation. No melena or hematochezia.  GENITOURINARY: No dysuria, frequency, hematuria, or incontinence  NEUROLOGICAL: No headaches, memory loss, loss of strength, numbness, or tremors  SKIN: No itching, burning, rashes, or lesions   LYMPH Nodes: No enlarged glands  ENDOCRINE: No heat or cold intolerance; No hair loss  MUSCULOSKELETAL: No joint pain or swelling; No muscle, back, or extremity pain  PSYCHIATRIC: No depression, anxiety, mood swings, or difficulty sleeping  HEME/LYMPH: No easy bruising, or bleeding gums  ALLERY AND IMMUNOLOGIC: No hives or eczema	    [X] All others negative	  [ ] Unable to obtain    PHYSICAL EXAM:  T(C): 36.8 (08-03-20 @ 07:55), Max: 36.8 (08-03-20 @ 07:55)  HR: 65 (08-03-20 @ 12:00) (29 - 73)  BP: 146/68 (08-03-20 @ 12:00) (112/90 - 177/70)  RR: 17 (08-03-20 @ 12:00) (14 - 22)  SpO2: 100% (08-03-20 @ 12:00) (98% - 100%)  Wt(kg): --  I&O's Summary    02 Aug 2020 07:01  -  03 Aug 2020 07:00  --------------------------------------------------------  IN: 250 mL / OUT: 2450 mL / NET: -2200 mL    03 Aug 2020 07:01  -  03 Aug 2020 13:42  --------------------------------------------------------  IN: 0 mL / OUT: 0 mL / NET: 0 mL        Appearance: Normal	  HEENT:   Normal oral mucosa, PERRL, EOMI	  Lymphatic: No lymphadenopathy  Cardiovascular: Normal S1 S2, No JVD, No murmurs, No edema  Respiratory: Lungs clear to auscultation	  Psychiatry: A & O x 3, Mood & affect appropriate  Gastrointestinal:  Soft, Non-tender, + BS	  Skin: No rashes, No ecchymoses, No cyanosis	  Neurologic: Non-focal  Extremities: Normal range of motion, No clubbing, cyanosis or edema  Vascular: Peripheral pulses palpable 2+ bilaterally    TELEMETRY: Sinus rhythm/junctional rhythm with V pacing @ 60    ECG: CHB with wide ventricular escape @ 29 bpm 	  RADIOLOGY:  OTHER: 	  	  LABS:	 	    CARDIAC MARKERS:                     14.9   11.18 )-----------( 269      ( 03 Aug 2020 04:00 )             45.5     08-03    139  |  103  |  14  ----------------------------<  114<H>  4.4   |  19<L>  |  0.72    Ca    9.7      03 Aug 2020 04:00  Phos  3.5     08-03  Mg     2.6     08-03    TPro  7.3  /  Alb  4.5  /  TBili  1.3<H>  /  DBili  x   /  AST  32  /  ALT  43<H>  /  AlkPhos  93  08-03    proBNP:   Lipid Profile:   HgA1c:   TSH: Thyroid Stimulating Hormone, Serum: 1.48 uIU/mL (08-02 @ 17:49)

## 2020-08-03 NOTE — DISCHARGE NOTE PROVIDER - NSDCCPCAREPLAN_GEN_ALL_CORE_FT
PRINCIPAL DISCHARGE DIAGNOSIS  Diagnosis: Heart block  Assessment and Plan of Treatment: PRINCIPAL DISCHARGE DIAGNOSIS  Diagnosis: Heart block  Assessment and Plan of Treatment: You were previously diagnosed with a Left Bundle Branch Block in the past. When you presented to the hospital this time, you had a very slow heart beat, and we found that you had a Complete Heart Block, which required a Transvenous Pacemaker in the ED, which was a temporary, life-saving measure. On 8/3/2020, you had an ultrasound of the heart, which showed that you may be at future risk for Heart Failure. We recommend that you follow-up with your Cardiologist Dr. Baig. Also on 8/3/2020, you had a procedure that implanted your Biventricular Pacemaker. You will also follow-up with an Electrophysiologist (appointment information was included in the box of Pacemaker supplies).

## 2020-08-03 NOTE — DISCHARGE NOTE PROVIDER - PROVIDER TOKENS
PROVIDER:[TOKEN:[[id number]]] PROVIDER:[TOKEN:[[id number]]],PROVIDER:[TOKEN:[[id number]],FOLLOWUP:[1 week],ESTABLISHEDPATIENT:[T]]

## 2020-08-03 NOTE — PROGRESS NOTE ADULT - ASSESSMENT
Assessment:     ***    Plan:    *** Assessment & Plan:     61 yo F w/ PMH of LBBB (diagnosed 10 years ago), hypertension, and hypothyroid (s/p thyroid surgery); p/w N/V/D, SOB, and bradycardia (29 bpm, s/p TVP); admitted with complete heart block.    #Cardiovascular  -known LBBB, admitted w/ CHB  -s/p transvenous pacing wire placement in ED (rate 60, MA 10, threshold 0.8)  -TTE this AM (completed)  -planning for BiV pacemaker this afternoon  -HTN: holding altace for now.    #Endocrine  -Hypothyroidism  -c/w synthroid Assessment & Plan:     59 yo F w/ PMH of LBBB (diagnosed 10 years ago), hypertension, and hypothyroid (s/p thyroid surgery); p/w worsening fatigue, N/V/D, SOB, and bradycardia (29 bpm, s/p TVP); admitted with CHB with wide and slow escape rhythm.    #CHB  -known LBBB, admitted w/ CHB  -s/p transvenous pacing wire placement in ED (rate 60, MA 10, threshold 0.8)  -f/u TTE this AM (completed)  -planning for BiV pacemaker this afternoon  -HTN: permissive HTN, holding altace for now.    #Elevated troponin   -71 today (from 37)  -trend until down-trending (AM labs ordered)    #Hypothyroidism  -c/w synthroid

## 2020-08-03 NOTE — DISCHARGE NOTE PROVIDER - NSDCFUSCHEDAPPT_GEN_ALL_CORE_FT
UVALDO BOGGS ; 08/17/2020 ; [facility] [id number] UVALDO BOGGS ; 08/17/2020 ; [facility] [phone] UVALDO BOGGS ; 08/17/2020 ; [facility] [phone] UVALDO BOGGS ; 08/17/2020 ; [facility] [phone] UVALDO BOGGS ; 08/17/2020 ; [facility] [phone]

## 2020-08-04 ENCOUNTER — TRANSCRIPTION ENCOUNTER (OUTPATIENT)
Age: 60
End: 2020-08-04

## 2020-08-04 VITALS — TEMPERATURE: 99 F

## 2020-08-04 LAB
ALBUMIN SERPL ELPH-MCNC: 4.2 G/DL — SIGNIFICANT CHANGE UP (ref 3.3–5)
ALP SERPL-CCNC: 89 U/L — SIGNIFICANT CHANGE UP (ref 40–120)
ALT FLD-CCNC: 33 U/L — SIGNIFICANT CHANGE UP (ref 4–33)
ANION GAP SERPL CALC-SCNC: 16 MMO/L — HIGH (ref 7–14)
AST SERPL-CCNC: 26 U/L — SIGNIFICANT CHANGE UP (ref 4–32)
BILIRUB SERPL-MCNC: 1.3 MG/DL — HIGH (ref 0.2–1.2)
BUN SERPL-MCNC: 24 MG/DL — HIGH (ref 7–23)
CALCIUM SERPL-MCNC: 9.8 MG/DL — SIGNIFICANT CHANGE UP (ref 8.4–10.5)
CHLORIDE SERPL-SCNC: 101 MMOL/L — SIGNIFICANT CHANGE UP (ref 98–107)
CO2 SERPL-SCNC: 21 MMOL/L — LOW (ref 22–31)
CREAT SERPL-MCNC: 0.91 MG/DL — SIGNIFICANT CHANGE UP (ref 0.5–1.3)
GLUCOSE SERPL-MCNC: 157 MG/DL — HIGH (ref 70–99)
HCT VFR BLD CALC: 47.5 % — HIGH (ref 34.5–45)
HGB BLD-MCNC: 15.6 G/DL — HIGH (ref 11.5–15.5)
MAGNESIUM SERPL-MCNC: 2.4 MG/DL — SIGNIFICANT CHANGE UP (ref 1.6–2.6)
MCHC RBC-ENTMCNC: 29.3 PG — SIGNIFICANT CHANGE UP (ref 27–34)
MCHC RBC-ENTMCNC: 32.8 % — SIGNIFICANT CHANGE UP (ref 32–36)
MCV RBC AUTO: 89.1 FL — SIGNIFICANT CHANGE UP (ref 80–100)
NRBC # FLD: 0 K/UL — SIGNIFICANT CHANGE UP (ref 0–0)
PHOSPHATE SERPL-MCNC: 4 MG/DL — SIGNIFICANT CHANGE UP (ref 2.5–4.5)
PLATELET # BLD AUTO: 230 K/UL — SIGNIFICANT CHANGE UP (ref 150–400)
PMV BLD: 10.3 FL — SIGNIFICANT CHANGE UP (ref 7–13)
POTASSIUM SERPL-MCNC: 4.6 MMOL/L — SIGNIFICANT CHANGE UP (ref 3.5–5.3)
POTASSIUM SERPL-SCNC: 4.6 MMOL/L — SIGNIFICANT CHANGE UP (ref 3.5–5.3)
PROT SERPL-MCNC: 7 G/DL — SIGNIFICANT CHANGE UP (ref 6–8.3)
RBC # BLD: 5.33 M/UL — HIGH (ref 3.8–5.2)
RBC # FLD: 12.8 % — SIGNIFICANT CHANGE UP (ref 10.3–14.5)
SODIUM SERPL-SCNC: 138 MMOL/L — SIGNIFICANT CHANGE UP (ref 135–145)
TROPONIN T, HIGH SENSITIVITY: 46 NG/L — SIGNIFICANT CHANGE UP (ref ?–14)
WBC # BLD: 11.04 K/UL — HIGH (ref 3.8–10.5)
WBC # FLD AUTO: 11.04 K/UL — HIGH (ref 3.8–10.5)

## 2020-08-04 PROCEDURE — 99232 SBSQ HOSP IP/OBS MODERATE 35: CPT

## 2020-08-04 PROCEDURE — 71045 X-RAY EXAM CHEST 1 VIEW: CPT | Mod: 26

## 2020-08-04 PROCEDURE — 99233 SBSQ HOSP IP/OBS HIGH 50: CPT

## 2020-08-04 RX ORDER — CARVEDILOL PHOSPHATE 80 MG/1
1 CAPSULE, EXTENDED RELEASE ORAL
Qty: 0 | Refills: 0 | DISCHARGE
Start: 2020-08-04

## 2020-08-04 RX ORDER — LEVOTHYROXINE SODIUM 125 MCG
50 TABLET ORAL DAILY
Refills: 0 | Status: DISCONTINUED | OUTPATIENT
Start: 2020-08-04 | End: 2020-08-04

## 2020-08-04 RX ORDER — CARVEDILOL PHOSPHATE 80 MG/1
3.12 CAPSULE, EXTENDED RELEASE ORAL EVERY 12 HOURS
Refills: 0 | Status: DISCONTINUED | OUTPATIENT
Start: 2020-08-04 | End: 2020-08-04

## 2020-08-04 RX ADMIN — CHLORHEXIDINE GLUCONATE 1 APPLICATION(S): 213 SOLUTION TOPICAL at 11:37

## 2020-08-04 RX ADMIN — CARVEDILOL PHOSPHATE 3.12 MILLIGRAM(S): 80 CAPSULE, EXTENDED RELEASE ORAL at 11:34

## 2020-08-04 RX ADMIN — Medication 250 MILLIGRAM(S): at 05:49

## 2020-08-04 NOTE — PROGRESS NOTE ADULT - ATTENDING COMMENTS
Patient seen and examined  60 year old woman with known LBBB admitted with CHB  SP TVP and now awaiting possible device with EP  Check TTE
61 yo F w/ PMH of LBBB (diagnosed 10 years ago), hypertension, and hypothyroid (s/p thyroid surgery); p/w worsening fatigue, N/V/D, SOB, and bradycardia (29 bpm, s/p TVP); admitted with CHB with LBBB- wide and slow escape rhythm. s/p BiV PPM. Will follow as outpt.
Patient seen and examined  60 year old woman with known LBBB admitted with CHB  SP PPM  Echo with inferolateral hypokinesis EF 50%; LHC nonobstructive  Will start Coreg  DC planning

## 2020-08-04 NOTE — DISCHARGE NOTE NURSING/CASE MANAGEMENT/SOCIAL WORK - PATIENT PORTAL LINK FT
You can access the FollowMyHealth Patient Portal offered by [facility] by registering at the following website: [email or web address]. By joining [employer]’s FollowMyHealth portal, you will also be able to view your health information using other applications (apps) compatible with our system.

## 2020-08-04 NOTE — PROGRESS NOTE ADULT - ASSESSMENT
61 yo F w/ PMH of LBBB (diagnosed 10 years ago), hypertension, and hypothyroid (s/p thyroid surgery); p/w worsening fatigue, N/V/D, SOB, and bradycardia (29 bpm, s/p TVP); admitted with CHB with wide and slow escape rhythm. Echocardiogram revealed mild segmental LV dysfunction with EF 50%. Cardiac catheterization revealed NCA. S/P BIVPM implantation yesterday. Device site clean, dry and intact with no bleeding or hematoma. Device teaching given to patient and she demonstrates understanding of the instructions. Device interrogation normal.   - May start beta blocker and ARB/ACE for mild LV dysfunction and patient is also with HTN  - May d/c home from EP standpoint if CXR normal  - F/u with device clinic on 8/17/20 @ 2:45 pm

## 2020-08-04 NOTE — PROGRESS NOTE ADULT - ASSESSMENT
Assessment & Plan:     59 yo F w/ PMH of LBBB (diagnosed 10 years ago), hypertension, and hypothyroid (s/p thyroid surgery); p/w worsening fatigue, N/V/D, SOB, and bradycardia (29 bpm, s/p TVP); admitted with CHB with wide and slow escape rhythm.    #CHB  -known LBBB, admitted w/ CHB  -s/p transvenous pacing wire placement in ED (rate 60, MA 10, threshold 0.8)  -f/u TTE this AM (completed)  -planning for BiV pacemaker this afternoon  -HTN: permissive HTN, holding altace for now.    #Elevated troponin   -71 today (from 37)  -trend until down-trending (AM labs ordered)    #Hypothyroidism  -c/w synthroid Assessment & Plan:     61 yo F w/ PMH of LBBB (diagnosed 10 years ago), hypertension, and hypothyroid (s/p thyroid surgery); p/w worsening fatigue, N/V/D, SOB, and bradycardia (29 bpm, s/p TVP); admitted with CHB with wide and slow escape rhythm. Patient's ECHO yesterday notable for mild segmental LV dysfunction with hypokinesia of the inferior wall (EF 50%), concerning as this may place patient at a predisposition toward heart failure. Cardiac catheterization revealed NCA. S/P BIVPM implantation yesterday, without complication.    #CHB  -known LBBB, admitted w/ CHB  -s/p transvenous pacing wire placement in ED (rate 60, MA 10, threshold 0.8)  -BiV pacemaker placed  -HTN: permissive HTN, holding altace    #Elevated troponin   -down today (8/4)    #Hypothyroidism  -c/w synthroid

## 2020-08-04 NOTE — PROGRESS NOTE ADULT - SUBJECTIVE AND OBJECTIVE BOX
PATIENT:  UVALDO BOGGS  [id number]    CHIEF COMPLAINT:  Patient is a 60y old  Female who presents with a chief complaint of complete heart block (02 Aug 2020 18:07)      INTERVAL HISTORY / OVERNIGHT EVENTS:  NAEON  Denies CP, SOB, dyspnea, nausea, emesis, diarrhea, abdominal pain, weakness, or other relevant symptoms.        MEDICATIONS:  MEDICATIONS  (STANDING):  chlorhexidine 4% Liquid 1 Application(s) Topical daily    MEDICATIONS  (PRN):      ALLERGIES:  Allergies    Keflex (Nausea)  penicillin (Rash)    Intolerances        OBJECTIVE:  ICU Vital Signs Last 24 Hrs  T(C): 36.7 (03 Aug 2020 04:00), Max: 36.7 (02 Aug 2020 19:10)  T(F): 98.1 (03 Aug 2020 04:00), Max: 98.1 (03 Aug 2020 04:00)  HR: 65 (03 Aug 2020 05:00) (29 - 73)  BP: 147/79 (03 Aug 2020 05:00) (112/90 - 177/70)  BP(mean): 92 (03 Aug 2020 05:00) (79 - 95)  ABP: --  ABP(mean): --  RR: 17 (03 Aug 2020 05:00) (14 - 22)  SpO2: 100% (03 Aug 2020 05:00) (98% - 100%)      Adult Advanced Hemodynamics Last 24 Hrs  CVP(mm Hg): --  CVP(cm H2O): --  CO: --  CI: --  PA: --  PA(mean): --  PCWP: --  SVR: --  SVRI: --  PVR: --  PVRI: --  CAPILLARY BLOOD GLUCOSE      POCT Blood Glucose.: 124 mg/dL (02 Aug 2020 18:34)    CAPILLARY BLOOD GLUCOSE      POCT Blood Glucose.: 124 mg/dL (02 Aug 2020 18:34)    I&O's Summary    02 Aug 2020 07:01  -  03 Aug 2020 07:00  --------------------------------------------------------  IN: 250 mL / OUT: 2450 mL / NET: -2200 mL      Daily Height in cm: 157.48 (02 Aug 2020 19:41)    Daily     PHYSICAL EXAMINATION:  General: WN/WD, NAD  HEENT: EOMI, moist mucous membranes  Neurology: A&Ox3, nonfocal, ISAACS x 4  Respiratory: CTA B/L, normal respiratory effort, no wheezes, crackles, rales  CV: Regular rate (s/p TVP placement), no murmurs, rubs or gallops  Abdominal: Soft, NT, ND +BS.  Extremities: No edema, + peripheral pulses    LABS:                          14.9   11.18 )-----------( 269      ( 03 Aug 2020 04:00 )             45.5     08-03    139  |  103  |  14  ----------------------------<  114<H>  4.4   |  19<L>  |  0.72    Ca    9.7      03 Aug 2020 04:00  Phos  3.5     08-03  Mg     2.6     08-03    TPro  7.3  /  Alb  4.5  /  TBili  1.3<H>  /  DBili  x   /  AST  32  /  ALT  43<H>  /  AlkPhos  93  08-03    LIVER FUNCTIONS - ( 03 Aug 2020 04:00 )  Alb: 4.5 g/dL / Pro: 7.3 g/dL / ALK PHOS: 93 u/L / ALT: 43 u/L / AST: 32 u/L / GGT: x           PT/INR - ( 03 Aug 2020 04:00 )   PT: 12.3 SEC;   INR: 1.07          PTT - ( 03 Aug 2020 04:00 )  PTT:30.2 SEC            TELEMETRY: This morning's telemetry showed isolated episode of 3 PVCs. PATIENT:  UVALDO BOGGS  [id number]    CHIEF COMPLAINT:  Patient is a 60y old  Female who presents with a chief complaint of complete heart block (04 Aug 2020 11:18)      INTERVAL HISTORY / OVERNIGHT EVENTS:  Patient s/p BiV placement O/N. No complications.  Patient reports feeling very well this morning. Only reports pain at incision site. Otherwise no complaints.  Denies CP, SOB, dyspnea, nausea, emesis, diarrhea, abdominal pain, weakness, or other relevant symptoms.  E.P. d/w patient discharge instructions, restrictions, and follow-up appointments.      MEDICATIONS:  MEDICATIONS  (STANDING):  carvedilol 3.125 milliGRAM(s) Oral every 12 hours  chlorhexidine 4% Liquid 1 Application(s) Topical daily    MEDICATIONS  (PRN):      ALLERGIES:  Allergies    Keflex (Nausea)  penicillin (Rash)    Intolerances        OBJECTIVE:  ICU Vital Signs Last 24 Hrs  T(C): 36.7 (04 Aug 2020 07:39), Max: 36.9 (03 Aug 2020 14:00)  T(F): 98 (04 Aug 2020 07:39), Max: 98.4 (03 Aug 2020 14:00)  HR: 65 (04 Aug 2020 11:00) (60 - 74)  BP: 127/50 (04 Aug 2020 11:00) (101/55 - 160/67)  BP(mean): 64 (04 Aug 2020 11:00) (61 - 90)  ABP: --  ABP(mean): --  RR: 18 (04 Aug 2020 11:00) (12 - 19)  SpO2: 98% (04 Aug 2020 11:00) (97% - 100%)      Adult Advanced Hemodynamics Last 24 Hrs  CVP(mm Hg): --  CVP(cm H2O): --  CO: --  CI: --  PA: --  PA(mean): --  PCWP: --  SVR: --  SVRI: --  PVR: --  PVRI: --  CAPILLARY BLOOD GLUCOSE        CAPILLARY BLOOD GLUCOSE      POCT Blood Glucose.: 124 mg/dL (02 Aug 2020 18:34)    I&O's Summary    03 Aug 2020 07:01  -  04 Aug 2020 07:00  --------------------------------------------------------  IN: 250 mL / OUT: 0 mL / NET: 250 mL      Daily     Daily     PHYSICAL EXAMINATION:  General: WN/WD NAD  HEENT: PERRLA, moist mucous membranes.  Neurology: A&Ox3, nonfocal, ISAACS x 4  Respiratory: CTA B/L, normal respiratory effort, no wheezes, crackles, rales  CV: regular rate, systolic murmur, no rubs or gallops  Abdominal: Soft, NT, ND +BS  Extremities: No edema, + peripheral pulses      LABS:                          15.6   11.04 )-----------( 230      ( 04 Aug 2020 06:25 )             47.5     08-04    138  |  101  |  24<H>  ----------------------------<  157<H>  4.6   |  21<L>  |  0.91    Ca    9.8      04 Aug 2020 06:25  Phos  4.0     08-04  Mg     2.4     08-04    TPro  7.0  /  Alb  4.2  /  TBili  1.3<H>  /  DBili  x   /  AST  26  /  ALT  33  /  AlkPhos  89  08-04    LIVER FUNCTIONS - ( 04 Aug 2020 06:25 )  Alb: 4.2 g/dL / Pro: 7.0 g/dL / ALK PHOS: 89 u/L / ALT: 33 u/L / AST: 26 u/L / GGT: x           PT/INR - ( 03 Aug 2020 04:00 )   PT: 12.3 SEC;   INR: 1.07          PTT - ( 03 Aug 2020 04:00 )  PTT:30.2 SEC

## 2020-08-04 NOTE — PROGRESS NOTE ADULT - SUBJECTIVE AND OBJECTIVE BOX
ANESTHESIA POSTOP CHECK    60y Female POSTOP DAY 1 S/P Biventricular PPM     Vital Signs Last 24 Hrs  T(C): 36.7 (04 Aug 2020 07:39), Max: 36.9 (03 Aug 2020 14:00)  T(F): 98 (04 Aug 2020 07:39), Max: 98.4 (03 Aug 2020 14:00)  HR: 65 (04 Aug 2020 11:00) (60 - 74)  BP: 127/50 (04 Aug 2020 11:00) (101/55 - 160/67)  BP(mean): 64 (04 Aug 2020 11:00) (61 - 90)  RR: 18 (04 Aug 2020 11:00) (12 - 19)  SpO2: 98% (04 Aug 2020 11:00) (97% - 100%)  I&O's Summary    03 Aug 2020 07:01  -  04 Aug 2020 07:00  --------------------------------------------------------  IN: 250 mL / OUT: 0 mL / NET: 250 mL        [X] NO APPARENT ANESTHESIA COMPLICATIONS      Comments:

## 2020-08-04 NOTE — PROGRESS NOTE ADULT - SUBJECTIVE AND OBJECTIVE BOX
Patient is seen and examined. Denies any chest pain, SOB, palpitations or dizziness. s/p BIVPM yesterday    PAST MEDICAL & SURGICAL HISTORY:  H/O left bundle branch block: diagnosed 7-10 years ago  Hypothyroid  Hypertension  History of thyroid surgery: Patient suffers from nausea with anesthesia      MEDICATIONS  (STANDING):  chlorhexidine 4% Liquid 1 Application(s) Topical daily    MEDICATIONS  (PRN):    Vital Signs Last 24 Hrs  T(C): 36.7 (04 Aug 2020 07:39), Max: 36.9 (03 Aug 2020 14:00)  T(F): 98 (04 Aug 2020 07:39), Max: 98.4 (03 Aug 2020 14:00)  HR: 62 (04 Aug 2020 08:00) (60 - 70)  BP: 144/72 (04 Aug 2020 08:00) (101/55 - 171/77)  BP(mean): 90 (04 Aug 2020 08:00) (61 - 97)  RR: 17 (04 Aug 2020 08:00) (12 - 20)  SpO2: 100% (04 Aug 2020 08:00) (97% - 100%)    INTERPRETATION OF TELEMETRY: Sinus rhythm with V pacing @ 60s    LABS:                        15.6   11.04 )-----------( 230      ( 04 Aug 2020 06:25 )             47.5     08-04    138  |  101  |  24<H>  ----------------------------<  157<H>  4.6   |  21<L>  |  0.91    Ca    9.8      04 Aug 2020 06:25  Phos  4.0     08-04  Mg     2.4     08-04    TPro  7.0  /  Alb  4.2  /  TBili  1.3<H>  /  DBili  x   /  AST  26  /  ALT  33  /  AlkPhos  89  08-04        PT/INR - ( 03 Aug 2020 04:00 )   PT: 12.3 SEC;   INR: 1.07          PTT - ( 03 Aug 2020 04:00 )  PTT:30.2 SEC    I&O's Summary    03 Aug 2020 07:01  -  04 Aug 2020 07:00  --------------------------------------------------------  IN: 250 mL / OUT: 0 mL / NET: 250 mL    PHYSICAL EXAM:    GENERAL: In no apparent distress, well nourished, and hydrated.  HEART: Regular rate and paced rhythm; No murmurs, rubs, or gallops.  PULMONARY: Clear to auscultation and percussion.  No rales, wheezing, or rhonchi bilaterally.  ABDOMEN: Soft, Nontender, Nondistended; Bowel sounds present  EXTREMITIES:  2+ Peripheral Pulses, No clubbing, cyanosis, or edema

## 2020-08-17 ENCOUNTER — APPOINTMENT (OUTPATIENT)
Dept: ELECTROPHYSIOLOGY | Facility: CLINIC | Age: 60
End: 2020-08-17
Payer: COMMERCIAL

## 2020-08-17 VITALS — RESPIRATION RATE: 14 BRPM | DIASTOLIC BLOOD PRESSURE: 78 MMHG | SYSTOLIC BLOOD PRESSURE: 149 MMHG | HEART RATE: 60 BPM

## 2020-08-17 PROBLEM — E03.9 HYPOTHYROIDISM, UNSPECIFIED: Chronic | Status: ACTIVE | Noted: 2020-08-02

## 2020-08-17 PROBLEM — Z86.79 PERSONAL HISTORY OF OTHER DISEASES OF THE CIRCULATORY SYSTEM: Chronic | Status: ACTIVE | Noted: 2020-08-02

## 2020-08-17 PROCEDURE — 99024 POSTOP FOLLOW-UP VISIT: CPT

## 2020-10-19 ENCOUNTER — APPOINTMENT (OUTPATIENT)
Dept: ELECTROPHYSIOLOGY | Facility: CLINIC | Age: 60
End: 2020-10-19
Payer: COMMERCIAL

## 2020-10-19 VITALS — SYSTOLIC BLOOD PRESSURE: 160 MMHG | DIASTOLIC BLOOD PRESSURE: 82 MMHG

## 2020-10-19 PROCEDURE — 93281 PM DEVICE PROGR EVAL MULTI: CPT

## 2020-10-19 PROCEDURE — 99072 ADDL SUPL MATRL&STAF TM PHE: CPT

## 2020-10-19 RX ORDER — LEVOTHYROXINE SODIUM 50 UG/1
50 TABLET ORAL
Refills: 0 | Status: DISCONTINUED | COMMUNITY
End: 2020-10-19

## 2020-11-06 ENCOUNTER — APPOINTMENT (OUTPATIENT)
Dept: CARDIOLOGY | Facility: CLINIC | Age: 60
End: 2020-11-06
Payer: COMMERCIAL

## 2020-11-06 ENCOUNTER — NON-APPOINTMENT (OUTPATIENT)
Age: 60
End: 2020-11-06

## 2020-11-06 VITALS
TEMPERATURE: 96.7 F | OXYGEN SATURATION: 100 % | HEART RATE: 66 BPM | HEIGHT: 62 IN | DIASTOLIC BLOOD PRESSURE: 94 MMHG | WEIGHT: 137 LBS | SYSTOLIC BLOOD PRESSURE: 164 MMHG | BODY MASS INDEX: 25.21 KG/M2

## 2020-11-06 DIAGNOSIS — Z87.891 PERSONAL HISTORY OF NICOTINE DEPENDENCE: ICD-10-CM

## 2020-11-06 DIAGNOSIS — Z83.3 FAMILY HISTORY OF DIABETES MELLITUS: ICD-10-CM

## 2020-11-06 DIAGNOSIS — Z86.79 PERSONAL HISTORY OF OTHER DISEASES OF THE CIRCULATORY SYSTEM: ICD-10-CM

## 2020-11-06 DIAGNOSIS — Z80.0 FAMILY HISTORY OF MALIGNANT NEOPLASM OF DIGESTIVE ORGANS: ICD-10-CM

## 2020-11-06 DIAGNOSIS — Z82.3 FAMILY HISTORY OF STROKE: ICD-10-CM

## 2020-11-06 DIAGNOSIS — Z84.1 FAMILY HISTORY OF DISORDERS OF KIDNEY AND URETER: ICD-10-CM

## 2020-11-06 DIAGNOSIS — Z82.49 FAMILY HISTORY OF ISCHEMIC HEART DISEASE AND OTHER DISEASES OF THE CIRCULATORY SYSTEM: ICD-10-CM

## 2020-11-06 DIAGNOSIS — Z86.39 PERSONAL HISTORY OF OTHER ENDOCRINE, NUTRITIONAL AND METABOLIC DISEASE: ICD-10-CM

## 2020-11-06 DIAGNOSIS — Z85.850 PERSONAL HISTORY OF MALIGNANT NEOPLASM OF THYROID: ICD-10-CM

## 2020-11-06 PROCEDURE — 93000 ELECTROCARDIOGRAM COMPLETE: CPT

## 2020-11-06 PROCEDURE — 99214 OFFICE O/P EST MOD 30 MIN: CPT

## 2020-11-06 PROCEDURE — 99072 ADDL SUPL MATRL&STAF TM PHE: CPT

## 2020-11-07 PROBLEM — Z80.0 FAMILY HISTORY OF MALIGNANT NEOPLASM OF STOMACH: Status: ACTIVE | Noted: 2020-11-07

## 2020-11-07 PROBLEM — Z84.1 FAMILY HISTORY OF CHRONIC RENAL IMPAIRMENT: Status: ACTIVE | Noted: 2020-11-07

## 2020-11-07 PROBLEM — Z86.79 HISTORY OF CARDIOMYOPATHY: Status: RESOLVED | Noted: 2020-11-07 | Resolved: 2020-11-07

## 2020-11-07 PROBLEM — Z86.79 HISTORY OF COMPLETE ATRIOVENTRICULAR BLOCK: Status: RESOLVED | Noted: 2020-11-07 | Resolved: 2020-11-07

## 2020-11-07 PROBLEM — Z87.891 FORMER SMOKER: Status: ACTIVE | Noted: 2020-11-07

## 2020-11-07 PROBLEM — Z85.850 HISTORY OF MALIGNANT NEOPLASM OF THYROID: Status: RESOLVED | Noted: 2020-11-07 | Resolved: 2020-11-07

## 2020-11-07 PROBLEM — Z86.79 HISTORY OF LEFT BUNDLE BRANCH BLOCK (LBBB): Status: RESOLVED | Noted: 2020-11-07 | Resolved: 2020-11-07

## 2020-11-07 PROBLEM — Z86.39 HISTORY OF HYPOTHYROIDISM: Status: RESOLVED | Noted: 2020-11-07 | Resolved: 2020-11-07

## 2020-11-07 PROBLEM — Z83.3 FAMILY HISTORY OF DIABETES MELLITUS: Status: ACTIVE | Noted: 2020-11-07

## 2020-11-07 PROBLEM — Z82.3 FAMILY HISTORY OF STROKE: Status: ACTIVE | Noted: 2020-11-07

## 2020-11-07 PROBLEM — Z82.49 FAMILY HISTORY OF ACUTE MYOCARDIAL INFARCTION: Status: ACTIVE | Noted: 2020-11-07

## 2020-11-07 PROBLEM — Z82.49 FAMILY HISTORY OF HYPERTENSION: Status: ACTIVE | Noted: 2020-11-07

## 2020-11-07 PROBLEM — Z82.49 FAMILY HISTORY OF CORONARY ARTERY DISEASE: Status: ACTIVE | Noted: 2020-11-07

## 2020-11-07 PROBLEM — Z86.79 HISTORY OF HYPERTENSION: Status: RESOLVED | Noted: 2020-11-07 | Resolved: 2020-11-07

## 2020-11-07 RX ORDER — RAMIPRIL 2.5 MG/1
2.5 CAPSULE ORAL
Refills: 0 | Status: DISCONTINUED | COMMUNITY
End: 2020-11-06

## 2020-11-07 NOTE — HISTORY OF PRESENT ILLNESS
[FreeTextEntry1] : Ms. Borjas presents to the office today to establish cardiovascular care.\par \par Ms. Borjas is a 60 year old female with a medical history significant for hypertension, left bundle branch block, 3rd degree AV block (s/p implantation of a biventricular pacemaker in August 2020 - see below), thyroid cancer (s/p partial thyroidectomy in 2014), and iatrogenic hypothyroidism.  Of note is that Ms. Borjas did not undergo radiation therapy or chemotherapy following her thyroidectomy.  \par \par Ms. Borjas was admitted to [facility] on August 3rd, 2020 with fatigue, dyspnea, and nausea.  An electrocardiogram demonstrated sinus rhythm with 3rd degree AV block and a wide complex escape rhythm at 29/minute.  A transvenous pacemaker was inserted in the Emergency Department and Ms. Borjas was subsequently transferred to the Cardiac Intensive Care Unit.\par \par A transthoracic echocardiogram performed on August 3rd, 2020 demonstrated mild segmental left ventricular systolic dysfunction, with hypokinesis of the inferior wall (LVEF 50%).  Right ventricular size and systolic function were normal.  There was mild mitral regurgitation and the left atrium was found to be mildly dilated.\par \par Ms. Borjas underwent cardiac catheterization on August 3rd, 2020.  In summary, coronary angiography demonstrated a co-dominant coronary circulation.  Coronary angiography demonstrated normal left main, left anterior descending, left circumflex, and right coronary arteries.\par \par On August 3rd, 2020, Ms. Borjas underwent insertion of a [employer] biventricular pacemaker device.  The procedure went well and was without complications.  Ms. Borjas was subsequently started on carvedilol 3.125 mg BID due to the mild LV systolic dysfunction noted on echocardiography.  She was subsequently discharged from the hospital on August 4th, 2020.\par \par Ms. Borjas had a biventricular pacemaker interrogation performed in the device clinic at [facility] on August 17th, 2020.  In summary, this demonstrated normal device function with adequate pacing and sensing thresholds noted.  There were no arrhythmic events noted.\par \par Ms. Borjas reports that she has been feeling well.  She is able to walk without limitation and denies having any chest pain or dyspnea either at rest or with exertion.  In addition, there has been no history of palpitations, presyncope, or syncope.  Ms. Borjas denies having any orthopnea, paroxysmal nocturnal dyspnea, or edema.  Of note is that Ms. Borjas has experienced the sensation of her eyes "burning" and also blurred vision that she feels began when she started taking carvedilol while admitted to the hospital.\par \par Ms. Borjas's most recent (from 9/16/2020) blood work was reviewed.  In summary, a metabolic panel demonstrated a serum sodium of 139 mmol/L, serum potassium 4.0 mmol/L, and a serum creatinine of 1.0 mg/dL.  A lipid profile demonstrated a total cholesterol of 235 mg/dL, triglycerides 99 mg/dL, HDL 66 mg/dL, and  mg/dL.  The serum free T4 and the serum thyroglobulin levels were normal.

## 2020-11-07 NOTE — DISCUSSION/SUMMARY
[FreeTextEntry1] : In summary, Ms. Borjas is a 60 year old female with a medical history significant for hypertension, left bundle branch block, 3rd degree AV block (s/p implantation of a biventricular pacemaker in August 2020), thyroid cancer (s/p partial thyroidectomy in 2014), and iatrogenic hypothyroidism.  Of note is that Ms. Borjas did not undergo radiation therapy or chemotherapy following her thyroidectomy.\par \par Ms. Borjas now presents to the office to establish cardiovascular care following her biventricular pacemaker insertion.  Ms. Borjas noted that she began having eye "burning" and blurred vision after she was started on carvedilol while admitted to [facility] in August 2020.  Although these are not typical expected side effects of this medication, I suggested to Ms. Borjas that she discontinue her carvedilol at this time in order to see if these side effects resolve.  In the meantime, I suggested to her that we increase her ramipril dose from 2.5 mg/day to 5 mg/day as her blood pressure was elevated at today's office visit.\par \par Ms. Borjas will follow up with me in approximately two months, at which time we will obtain a follow up transthoracic echocardiogram in order to re-evaluate the left ventricular systolic function.  In addition, Ms. Borjas will have a remote interrogation performed on her biventricular pacemaker on January 20th, 2021.\par \par Again, I would like to thank you for the privilege of participating in the care of your patient.  I will be certain to be in touch with you again following Ms. Borjas's next office visit.

## 2021-01-15 ENCOUNTER — OUTPATIENT (OUTPATIENT)
Dept: OUTPATIENT SERVICES | Facility: HOSPITAL | Age: 61
LOS: 1 days | End: 2021-01-15

## 2021-01-15 ENCOUNTER — APPOINTMENT (OUTPATIENT)
Dept: CV DIAGNOSITCS | Facility: HOSPITAL | Age: 61
End: 2021-01-15
Payer: COMMERCIAL

## 2021-01-15 ENCOUNTER — NON-APPOINTMENT (OUTPATIENT)
Age: 61
End: 2021-01-15

## 2021-01-15 ENCOUNTER — APPOINTMENT (OUTPATIENT)
Dept: CARDIOLOGY | Facility: CLINIC | Age: 61
End: 2021-01-15
Payer: COMMERCIAL

## 2021-01-15 VITALS
OXYGEN SATURATION: 100 % | TEMPERATURE: 97 F | WEIGHT: 140 LBS | BODY MASS INDEX: 25.76 KG/M2 | SYSTOLIC BLOOD PRESSURE: 155 MMHG | HEIGHT: 62 IN | DIASTOLIC BLOOD PRESSURE: 77 MMHG | HEART RATE: 66 BPM

## 2021-01-15 DIAGNOSIS — I10 ESSENTIAL (PRIMARY) HYPERTENSION: ICD-10-CM

## 2021-01-15 DIAGNOSIS — Z98.890 OTHER SPECIFIED POSTPROCEDURAL STATES: Chronic | ICD-10-CM

## 2021-01-15 PROCEDURE — 93000 ELECTROCARDIOGRAM COMPLETE: CPT

## 2021-01-15 PROCEDURE — 99214 OFFICE O/P EST MOD 30 MIN: CPT

## 2021-01-15 PROCEDURE — 99072 ADDL SUPL MATRL&STAF TM PHE: CPT

## 2021-01-15 PROCEDURE — 93306 TTE W/DOPPLER COMPLETE: CPT | Mod: 26

## 2021-01-17 NOTE — PHYSICAL EXAM
[General Appearance - Well Developed] : well developed [General Appearance - Well Nourished] : well nourished [Normal Conjunctiva] : the conjunctiva exhibited no abnormalities [Eyelids - No Xanthelasma] : the eyelids demonstrated no xanthelasmas [Normal Oral Mucosa] : normal oral mucosa [No Oral Cyanosis] : no oral cyanosis [Normal Jugular Venous V Waves Present] : normal jugular venous V waves present [No Jugular Venous Ferro A Waves] : no jugular venous ferro A waves [] : no respiratory distress [Respiration, Rhythm And Depth] : normal respiratory rhythm and effort [Auscultation Breath Sounds / Voice Sounds] : lungs were clear to auscultation bilaterally [Heart Sounds] : normal S1 and S2 [Murmurs] : no murmurs present [Edema] : no peripheral edema present [Bowel Sounds] : normal bowel sounds [Abdomen Soft] : soft [Abdomen Tenderness] : non-tender [Abnormal Walk] : normal gait [Nail Clubbing] : no clubbing of the fingernails [Cyanosis, Localized] : no localized cyanosis [Skin Color & Pigmentation] : normal skin color and pigmentation [Oriented To Time, Place, And Person] : oriented to person, place, and time

## 2021-01-19 ENCOUNTER — APPOINTMENT (OUTPATIENT)
Dept: ELECTROPHYSIOLOGY | Facility: CLINIC | Age: 61
End: 2021-01-19
Payer: COMMERCIAL

## 2021-01-19 PROCEDURE — 93296 REM INTERROG EVL PM/IDS: CPT

## 2021-01-19 PROCEDURE — 93294 REM INTERROG EVL PM/LDLS PM: CPT

## 2021-01-19 RX ORDER — RAMIPRIL 5 MG/1
5 CAPSULE ORAL DAILY
Qty: 90 | Refills: 2 | Status: DISCONTINUED | COMMUNITY
Start: 1900-01-01 | End: 2021-01-15

## 2021-01-19 NOTE — DISCUSSION/SUMMARY
[FreeTextEntry1] : Of note is that Ms. Borjas had a follow up transthoracic echocardiogram performed at the time of today's office visit.  In summary, this demonstrated normal LV and RV systolic function, with no regional wall motion abnormalities (LVEF 64%).  There was minimal mitral regurgitation and mild aortic regurgitation.  In summary, left ventricular systolic function has improved compared to Ms. Borjas's previous study of 8/3/2020.\par \par In summary, Mr. Borjas appears to be doing well from a cardiac standpoint.  She maintains a good functional capacity and has not had any symptoms suggestive of angina or congestive heart failure.  In addition, the symptoms that Ms. Borjas had previously been experiencing (a burning sensation in the eyes as well as blurred vision), that she ascribed to carvedilol medication, are no longer present now that this medication has been stopped.  Today's transthoracic echocardiogram demonstrates an improvement in Ms. Borjas's LV systolic function, which is now normal.\par \par However, I noted that Ms. Borjas's blood pressure was elevated at today's office visit.  This may be related to the fact that she is no longer deriving the antihypertensive benefit of carvedilol.  I therefore suggested to her that we increase her ramipril dose from 5 mg/day to 10 mg/day.  Ms. Borjas is in agreement with this plan and will make the medication dosage change.  Ms. Borjas will follow up with me in approximately six months.

## 2021-02-25 ENCOUNTER — NON-APPOINTMENT (OUTPATIENT)
Age: 61
End: 2021-02-25

## 2021-02-26 ENCOUNTER — NON-APPOINTMENT (OUTPATIENT)
Age: 61
End: 2021-02-26

## 2021-02-26 ENCOUNTER — APPOINTMENT (OUTPATIENT)
Dept: OPHTHALMOLOGY | Facility: CLINIC | Age: 61
End: 2021-02-26
Payer: COMMERCIAL

## 2021-02-26 PROCEDURE — 99072 ADDL SUPL MATRL&STAF TM PHE: CPT

## 2021-02-26 PROCEDURE — 92004 COMPRE OPH EXAM NEW PT 1/>: CPT

## 2021-04-20 ENCOUNTER — NON-APPOINTMENT (OUTPATIENT)
Age: 61
End: 2021-04-20

## 2021-04-20 ENCOUNTER — APPOINTMENT (OUTPATIENT)
Dept: ELECTROPHYSIOLOGY | Facility: CLINIC | Age: 61
End: 2021-04-20
Payer: COMMERCIAL

## 2021-04-20 PROCEDURE — 93294 REM INTERROG EVL PM/LDLS PM: CPT

## 2021-04-20 PROCEDURE — 93296 REM INTERROG EVL PM/IDS: CPT

## 2021-07-23 ENCOUNTER — NON-APPOINTMENT (OUTPATIENT)
Age: 61
End: 2021-07-23

## 2021-07-23 ENCOUNTER — APPOINTMENT (OUTPATIENT)
Dept: ELECTROPHYSIOLOGY | Facility: CLINIC | Age: 61
End: 2021-07-23
Payer: COMMERCIAL

## 2021-07-23 PROCEDURE — 93296 REM INTERROG EVL PM/IDS: CPT

## 2021-07-23 PROCEDURE — 93294 REM INTERROG EVL PM/LDLS PM: CPT

## 2021-08-06 ENCOUNTER — APPOINTMENT (OUTPATIENT)
Dept: CARDIOLOGY | Facility: CLINIC | Age: 61
End: 2021-08-06
Payer: COMMERCIAL

## 2021-08-06 ENCOUNTER — NON-APPOINTMENT (OUTPATIENT)
Age: 61
End: 2021-08-06

## 2021-08-06 VITALS
BODY MASS INDEX: 26.34 KG/M2 | DIASTOLIC BLOOD PRESSURE: 79 MMHG | OXYGEN SATURATION: 98 % | SYSTOLIC BLOOD PRESSURE: 130 MMHG | HEART RATE: 64 BPM | HEIGHT: 62 IN

## 2021-08-06 VITALS — BODY MASS INDEX: 26.34 KG/M2 | WEIGHT: 144 LBS

## 2021-08-06 PROCEDURE — 99214 OFFICE O/P EST MOD 30 MIN: CPT

## 2021-08-06 PROCEDURE — 93000 ELECTROCARDIOGRAM COMPLETE: CPT

## 2021-08-07 NOTE — DISCUSSION/SUMMARY
[FreeTextEntry1] : In summary, Ms. Borjas appears to be doing very well from a cardiac standpoint.  She has been able to maintain a good functional capacity and has not had any symptoms suggestive of angina or congestive heart failure.  In addition, her blood pressure is under very good control on ramipril 10 mg/day.  I encouraged Ms. Borjas to continue to exercise as tolerated.  I also advised her to continue with her current medical regimen.\par \par Ms. Borjas will have a permanent pacemaker interrogation performed in the device clinic in October 2021 and will follow up with me in approximately six months, at which time we will obtain a follow up transthoracic echocardiogram.  Again, I would like to thank you for the privilege of participating in the care of your patient.  I will be certain to be in touch with you again following Ms. Borjas's next office visit.

## 2021-08-07 NOTE — HISTORY OF PRESENT ILLNESS
[FreeTextEntry1] : Ms. Borjas presents to the office today for a follow up visit.\par \par Ms. Borjas is a 61 year old female with a medical history significant for hypertension, left bundle branch block, 3rd degree AV block (s/p implantation of a biventricular pacemaker in August 2020 - see below), thyroid cancer (s/p partial thyroidectomy in 2014), and iatrogenic hypothyroidism.  Of note is that Ms. Borajs did not undergo radiation therapy or chemotherapy following her thyroidectomy.  \par \par Ms. Borjas was admitted to [facility] on August 3rd, 2020 with fatigue, dyspnea, and nausea.  An electrocardiogram demonstrated sinus rhythm with 3rd degree AV block and a wide complex escape rhythm at 29/minute.  A transvenous pacemaker was inserted in the Emergency Department and Ms. Borjas was subsequently transferred to the Cardiac Intensive Care Unit.\par \par A transthoracic echocardiogram performed on August 3rd, 2020 demonstrated mild segmental left ventricular systolic dysfunction, with hypokinesis of the inferior wall (LVEF 50%).  Right ventricular size and systolic function were normal.  There was mild mitral regurgitation and the left atrium was found to be mildly dilated.\par \par Ms. Borjas underwent cardiac catheterization on August 3rd, 2020.  In summary, coronary angiography demonstrated a co-dominant coronary circulation.  Coronary angiography demonstrated normal left main, left anterior descending, left circumflex, and right coronary arteries.\par \par On August 3rd, 2020, Ms. Borjas underwent insertion of a Medtronic biventricular pacemaker device.  The procedure went well and was without complications.  Ms. Borjas was subsequently started on carvedilol 3.125 mg BID due to the mild LV systolic dysfunction noted on echocardiography.  She was subsequently discharged from the hospital on August 4th, 2020.\par \par Ms. Borjas's previous (from 9/16/2020) blood work was reviewed.  In summary, a metabolic panel demonstrated a serum sodium of 139 mmol/L, serum potassium 4.0 mmol/L, and a serum creatinine of 1.0 mg/dL.  A lipid profile demonstrated a total cholesterol of 235 mg/dL, triglycerides 99 mg/dL, HDL 66 mg/dL, and  mg/dL.  The serum free T4 and the serum thyroglobulin levels were normal.\par \par Ms. Borjas's most recent transthoracic echocardiogram was performed at [facility] on January 15th, 2021.  In summary, this demonstrated normal LV and RV systolic function, with no regional wall motion abnormalities (LVEF 64%).  There was minimal mitral regurgitation and mild aortic regurgitation.  In summary, left ventricular systolic function had improved compared to Ms. Borjas's previous study of 8/3/2020.\par \par Ms. Borjas's most recent biventricular pacemaker interrogation took place remotely on July 23rd, 2021.  In summary, this demonstrated that all measured data were within normal limits.  There were a few false AT/AF detections and several SVT episodes lasting up to 30 seconds (at a heart rate of 155/minute).\par \par Ms. Borjas reports that she has been feeling well since her last office visit.  She walks up to one hour per day and does not experience any difficulty with this.  In particular, there has been no chest pain or dyspnea either at rest or with exertion.  In addition, Ms. Borjas denies having any palpitations, presyncope, or syncope.  There has been no orthopnea, paroxysmal nocturnal dyspnea, or edema.  Of note is that Ms. Borjas is scheduled to consult with a gastroenterologist in approximately two weeks in order to schedule a screening colonoscopy.

## 2021-10-25 ENCOUNTER — APPOINTMENT (OUTPATIENT)
Dept: ELECTROPHYSIOLOGY | Facility: CLINIC | Age: 61
End: 2021-10-25
Payer: COMMERCIAL

## 2021-10-25 VITALS — DIASTOLIC BLOOD PRESSURE: 69 MMHG | RESPIRATION RATE: 14 BRPM | HEART RATE: 64 BPM | SYSTOLIC BLOOD PRESSURE: 145 MMHG

## 2021-10-25 PROCEDURE — 93281 PM DEVICE PROGR EVAL MULTI: CPT

## 2021-11-01 ENCOUNTER — APPOINTMENT (OUTPATIENT)
Dept: ELECTROPHYSIOLOGY | Facility: CLINIC | Age: 61
End: 2021-11-01

## 2022-01-27 ENCOUNTER — APPOINTMENT (OUTPATIENT)
Dept: ELECTROPHYSIOLOGY | Facility: CLINIC | Age: 62
End: 2022-01-27

## 2022-01-28 ENCOUNTER — APPOINTMENT (OUTPATIENT)
Dept: CARDIOLOGY | Facility: CLINIC | Age: 62
End: 2022-01-28
Payer: COMMERCIAL

## 2022-01-28 ENCOUNTER — NON-APPOINTMENT (OUTPATIENT)
Age: 62
End: 2022-01-28

## 2022-01-28 ENCOUNTER — OUTPATIENT (OUTPATIENT)
Dept: OUTPATIENT SERVICES | Facility: HOSPITAL | Age: 62
LOS: 1 days | End: 2022-01-28
Payer: COMMERCIAL

## 2022-01-28 ENCOUNTER — APPOINTMENT (OUTPATIENT)
Dept: CV DIAGNOSITCS | Facility: HOSPITAL | Age: 62
End: 2022-01-28

## 2022-01-28 ENCOUNTER — APPOINTMENT (OUTPATIENT)
Dept: ELECTROPHYSIOLOGY | Facility: CLINIC | Age: 62
End: 2022-01-28
Payer: COMMERCIAL

## 2022-01-28 VITALS
SYSTOLIC BLOOD PRESSURE: 151 MMHG | HEART RATE: 67 BPM | OXYGEN SATURATION: 100 % | BODY MASS INDEX: 26.7 KG/M2 | DIASTOLIC BLOOD PRESSURE: 82 MMHG | HEIGHT: 62 IN

## 2022-01-28 VITALS — DIASTOLIC BLOOD PRESSURE: 81 MMHG | SYSTOLIC BLOOD PRESSURE: 149 MMHG

## 2022-01-28 VITALS — WEIGHT: 146 LBS | BODY MASS INDEX: 26.7 KG/M2

## 2022-01-28 DIAGNOSIS — Z98.890 OTHER SPECIFIED POSTPROCEDURAL STATES: Chronic | ICD-10-CM

## 2022-01-28 DIAGNOSIS — I10 ESSENTIAL (PRIMARY) HYPERTENSION: ICD-10-CM

## 2022-01-28 PROCEDURE — 93296 REM INTERROG EVL PM/IDS: CPT

## 2022-01-28 PROCEDURE — 93294 REM INTERROG EVL PM/LDLS PM: CPT

## 2022-01-28 PROCEDURE — 93306 TTE W/DOPPLER COMPLETE: CPT | Mod: 26

## 2022-01-28 PROCEDURE — 93000 ELECTROCARDIOGRAM COMPLETE: CPT

## 2022-01-28 PROCEDURE — 99214 OFFICE O/P EST MOD 30 MIN: CPT

## 2022-01-29 NOTE — DISCUSSION/SUMMARY
[FreeTextEntry1] : Of note is that Ms. Borjas had a follow up transthoracic echocardiogram performed at the time of today's office visit.  In summary, this demonstrated normal LV and RV systolic function, with no regional wall motion abnormalities.  There was mild mitral regurgitation and mild aortic regurgitatation.  The aortic valve was found to be calcified and trileaflet with normal opening.  In summary, there were no significant changes compared to Ms. Borjas's previous study of 1/15/2021.\par \par In summary, Ms. Borjas appears to be doing well from a cardiac standpoint.  She continues to maintain a good functional capacity and has not had any recent symptoms suggestive of angina or congestive heart failure.  Today's follow up transthoracic echocardiogram demonstrates that the left ventricular systolic function remains normal.\par \par Ms. Borjas's blood pressure was mildly elevated at today's office visit.  I suggested to her that we add amlodipine 2.5 mg/day to her medical regimen.  Ms. Borjas will follow up with me in approximately six months.  Again, I would like to thank you for the privilege of participating in the care of your patient.  I will be certain to be in touch with you again following Ms. Borjas's next office visit.

## 2022-01-29 NOTE — HISTORY OF PRESENT ILLNESS
[FreeTextEntry1] : Ms. Borjas presents to the office today for a follow up visit.\par \par Ms. Borjas is a 61 year old female with a medical history significant for hypertension, left bundle branch block, 3rd degree AV block (s/p implantation of a biventricular pacemaker in August 2020 - see below), thyroid cancer (s/p partial thyroidectomy in 2014), and iatrogenic hypothyroidism.  Of note is that Ms. Borjas did not undergo radiation therapy or chemotherapy following her thyroidectomy.  \par \par Ms. Borjas was admitted to [facility] on August 3rd, 2020 with fatigue, dyspnea, and nausea.  An electrocardiogram demonstrated sinus rhythm with 3rd degree AV block and a wide complex escape rhythm at 29/minute.  A transvenous pacemaker was inserted in the Emergency Department and Ms. Borjas was subsequently transferred to the Cardiac Intensive Care Unit.\par \par A transthoracic echocardiogram performed on August 3rd, 2020 demonstrated mild segmental left ventricular systolic dysfunction, with hypokinesis of the inferior wall (LVEF 50%).  Right ventricular size and systolic function were normal.  There was mild mitral regurgitation and the left atrium was found to be mildly dilated.\par \par Ms. Borjas underwent cardiac catheterization on August 3rd, 2020.  In summary, coronary angiography demonstrated a co-dominant coronary circulation.  Coronary angiography demonstrated normal left main, left anterior descending, left circumflex, and right coronary arteries.\par \par On August 3rd, 2020, Ms. Borjas underwent insertion of a Medtronic biventricular pacemaker device.  The procedure went well and was without complications.  Ms. Borjas was subsequently started on carvedilol 3.125 mg BID due to the mild LV systolic dysfunction noted on echocardiography.  She was subsequently discharged from the hospital on August 4th, 2020.\par \par Ms. Borjas's previous (from 9/16/2020) blood work was reviewed.  In summary, a metabolic panel demonstrated a serum sodium of 139 mmol/L, serum potassium 4.0 mmol/L, and a serum creatinine of 1.0 mg/dL.  A lipid profile demonstrated a total cholesterol of 235 mg/dL, triglycerides 99 mg/dL, HDL 66 mg/dL, and  mg/dL.  The serum free T4 and the serum thyroglobulin levels were normal.\par \par Ms. Borjas's most recent transthoracic echocardiogram was performed at [facility] on January 15th, 2021.  In summary, this demonstrated normal LV and RV systolic function, with no regional wall motion abnormalities (LVEF 64%).  There was minimal mitral regurgitation and mild aortic regurgitation.  In summary, left ventricular systolic function had improved compared to Ms. Borjas's previous study of 8/3/2020.\par \par A biventricular pacemaker interrogation that took place remotely on July 23rd, 2021 demonstrated a few false AT/AF detections and several SVT episodes lasting up to 30 seconds (at a heart rate of 155/minute).\par \par Ms. Borjas's most recent permanent pacemaker interrogation took place in the device clinic on October 25th, 2021.  In summary, this demonstrated an underlying heart rhythm of complete heart block with a ventricular rate in the 30's per minute.  Normal biventricular pacemaker function was demonstrated, with adequate pacing and sensing thresholds displayed.  Stored data demonstrated brief episodes of supraventricular tachycardia.\par \par Ms. Borjas reports that she has been feeling well since her last office visit.  She continues to ambulate without difficulty and denies having any chest pain or dyspnea either at rest or with exertion.  In addition, there has been no palpitations, presyncope, or syncope.  Ms. Borjas denies having any orthopnea, paroxysmal nocturnal dyspnea, or edema.  Ms. Borjas reports that she underwent a colonoscopy in November 2021, at which time a polyp may have been resected.

## 2022-03-16 NOTE — REASON FOR VISIT
Nidhi Sanchez is a 71 y.o. female here for a diabetic eye screening with non-dilated fundus photos per Dr. Mayfield.    Patient cooperative?: Yes  Small pupils?: No  Last eye exam: 2/24/2022    For exam results, see Encounter Report.       [FreeTextEntry1] : Ms. Borjas presents to the office today for a follow up visit.

## 2022-04-29 ENCOUNTER — APPOINTMENT (OUTPATIENT)
Dept: ELECTROPHYSIOLOGY | Facility: CLINIC | Age: 62
End: 2022-04-29
Payer: COMMERCIAL

## 2022-04-29 ENCOUNTER — NON-APPOINTMENT (OUTPATIENT)
Age: 62
End: 2022-04-29

## 2022-04-29 PROCEDURE — 93296 REM INTERROG EVL PM/IDS: CPT

## 2022-04-29 PROCEDURE — 93294 REM INTERROG EVL PM/LDLS PM: CPT

## 2022-07-29 ENCOUNTER — APPOINTMENT (OUTPATIENT)
Dept: CARDIOLOGY | Facility: CLINIC | Age: 62
End: 2022-07-29

## 2022-07-29 ENCOUNTER — NON-APPOINTMENT (OUTPATIENT)
Age: 62
End: 2022-07-29

## 2022-07-29 ENCOUNTER — APPOINTMENT (OUTPATIENT)
Dept: ELECTROPHYSIOLOGY | Facility: CLINIC | Age: 62
End: 2022-07-29

## 2022-07-29 PROCEDURE — 93296 REM INTERROG EVL PM/IDS: CPT

## 2022-07-29 PROCEDURE — 93294 REM INTERROG EVL PM/LDLS PM: CPT

## 2022-07-29 PROCEDURE — 36415 COLL VENOUS BLD VENIPUNCTURE: CPT

## 2022-08-05 ENCOUNTER — APPOINTMENT (OUTPATIENT)
Dept: CARDIOLOGY | Facility: CLINIC | Age: 62
End: 2022-08-05

## 2022-08-05 ENCOUNTER — NON-APPOINTMENT (OUTPATIENT)
Age: 62
End: 2022-08-05

## 2022-08-05 VITALS
BODY MASS INDEX: 25.97 KG/M2 | OXYGEN SATURATION: 99 % | HEART RATE: 83 BPM | RESPIRATION RATE: 16 BRPM | WEIGHT: 142 LBS | TEMPERATURE: 98.1 F | SYSTOLIC BLOOD PRESSURE: 133 MMHG | DIASTOLIC BLOOD PRESSURE: 83 MMHG

## 2022-08-05 DIAGNOSIS — Z86.39 PERSONAL HISTORY OF OTHER ENDOCRINE, NUTRITIONAL AND METABOLIC DISEASE: ICD-10-CM

## 2022-08-05 LAB
25(OH)D3 SERPL-MCNC: 37.5 NG/ML
ALBUMIN SERPL ELPH-MCNC: 4.6 G/DL
ALP BLD-CCNC: 70 U/L
ALT SERPL-CCNC: 17 U/L
ANION GAP SERPL CALC-SCNC: 11 MMOL/L
AST SERPL-CCNC: 24 U/L
BASOPHILS # BLD AUTO: 0.06 K/UL
BASOPHILS NFR BLD AUTO: 0.9 %
BILIRUB SERPL-MCNC: 1 MG/DL
BUN SERPL-MCNC: 21 MG/DL
CALCIUM SERPL-MCNC: 9.5 MG/DL
CHLORIDE SERPL-SCNC: 103 MMOL/L
CHOLEST SERPL-MCNC: 264 MG/DL
CO2 SERPL-SCNC: 26 MMOL/L
CREAT SERPL-MCNC: 1.09 MG/DL
EGFR: 58 ML/MIN/1.73M2
EOSINOPHIL # BLD AUTO: 0.11 K/UL
EOSINOPHIL NFR BLD AUTO: 1.6 %
ESTIMATED AVERAGE GLUCOSE: 103 MG/DL
GLUCOSE SERPL-MCNC: 70 MG/DL
HBA1C MFR BLD HPLC: 5.2 %
HCT VFR BLD CALC: 38.2 %
HDLC SERPL-MCNC: 82 MG/DL
HGB BLD-MCNC: 12.8 G/DL
IMM GRANULOCYTES NFR BLD AUTO: 0.3 %
LDLC SERPL CALC-MCNC: 160 MG/DL
LYMPHOCYTES # BLD AUTO: 1.53 K/UL
LYMPHOCYTES NFR BLD AUTO: 22.9 %
MAN DIFF?: NORMAL
MCHC RBC-ENTMCNC: 30 PG
MCHC RBC-ENTMCNC: 33.5 GM/DL
MCV RBC AUTO: 89.7 FL
MONOCYTES # BLD AUTO: 0.57 K/UL
MONOCYTES NFR BLD AUTO: 8.5 %
NEUTROPHILS # BLD AUTO: 4.39 K/UL
NEUTROPHILS NFR BLD AUTO: 65.8 %
NONHDLC SERPL-MCNC: 181 MG/DL
PLATELET # BLD AUTO: 238 K/UL
POTASSIUM SERPL-SCNC: 4.3 MMOL/L
PROT SERPL-MCNC: 6.8 G/DL
RBC # BLD: 4.26 M/UL
RBC # FLD: 12.1 %
SODIUM SERPL-SCNC: 140 MMOL/L
TRIGL SERPL-MCNC: 105 MG/DL
TSH SERPL-ACNC: 3.66 UIU/ML
WBC # FLD AUTO: 6.68 K/UL

## 2022-08-05 PROCEDURE — 99214 OFFICE O/P EST MOD 30 MIN: CPT | Mod: 25

## 2022-08-05 PROCEDURE — 93000 ELECTROCARDIOGRAM COMPLETE: CPT

## 2022-08-05 RX ORDER — RAMIPRIL 10 MG/1
10 CAPSULE ORAL DAILY
Qty: 90 | Refills: 3 | Status: ACTIVE | COMMUNITY
Start: 1900-01-01 | End: 1900-01-01

## 2022-08-07 PROBLEM — Z86.39 HISTORY OF HYPERLIPIDEMIA: Status: RESOLVED | Noted: 2022-08-07 | Resolved: 2022-08-07

## 2022-08-07 NOTE — DISCUSSION/SUMMARY
[FreeTextEntry1] : In summary, Ms. Borjas appears to be doing well from a cardiac standpoint.  She continues to maintain a good functional capacity and has not had any symptoms suggestive of angina or congestive heart failure.  In addition, her blood pressure is under good control on her current medical regimen and a recent permanent pacemaker interrogation confirmed normal device function.\par \par Recent lab work demonstrated an elevated serum LD (160 mg/dL).  Although Ms. Borjas had been started on atorvastatin 20 mg/day (in Primary Care), she has not yet begun taking this medication.  I advised her to begin taking the atorvastatin medication.  She will have a follow up lipid profile performed in Primary Care in 6-8 weeks.  Ms. Borjas will follow up with me in approximately six months, at which time we will obtain a follow up transthoracic echocardiogram.\par \par Again, I would like to thank you for the privilege of participating in the care of your patient.  I will be certain to be in touch with you again following Ms. Borjas's next office visit.

## 2022-10-28 ENCOUNTER — APPOINTMENT (OUTPATIENT)
Dept: ELECTROPHYSIOLOGY | Facility: CLINIC | Age: 62
End: 2022-10-28

## 2022-10-28 ENCOUNTER — NON-APPOINTMENT (OUTPATIENT)
Age: 62
End: 2022-10-28

## 2022-10-28 PROCEDURE — 93294 REM INTERROG EVL PM/LDLS PM: CPT

## 2022-10-28 PROCEDURE — 93296 REM INTERROG EVL PM/IDS: CPT

## 2022-11-11 NOTE — PATIENT PROFILE ADULT - NSPRESCRUSEDDRG_GEN_A_NUR
[facility]  Internal Medicine  Progress Note    Date of Service: 11/11/2022    Patient: Pablo Baez  MRN: [id number]  Admission Date: 11/5/2022      Assessment & Plan: Pablo Baez is a 77 year old female w/PMH parkinsons disease, UTI, BPPV, cognitive impairment who presents from her [facility] with recurrent falls.        Pseudomonas UTI  Streptococcus Oralis Bacteremia  Endocarditis - pt presented to the ED with recurrent falls and noted to have a temp of 100.1.  Wbc on 11/5 13.9 then improved on 10.9 on repeat ED evaluation. Influeza/RSV/COVID negative.  CXR negative.  UA with no clear sign of infection on admission, but started on Ceftriaxone empirically.   - urine culture growing Pseudomonas Aeruginosa, treated with IV Cefepime  - 11/6 blood cultures 2/2 growing streptococcus oralis  - started on Vancomycin on 11/6, discontinued on 11/11  - repeat blood cultures daily have been NGTD  - ID consulted 11/8 and recommended echocardiogram to evaluate for endocarditis  - echocardiogram 11/9 revealed Valves are thickened with no obvious vegetations.  - LEN performed on 11/10 revealed mitral valve with likely small vegetations    - ID followed up today 11/11 and recommend placement of a midline and 3 more weeks of IV Ceftriaxone  - Cardiology consult for any further recommendations for endocarditis treatment/follow up     Recurrent Falls  Close Head Injury  Hx Parkinson's Disease with Gait Instability - pt presents from [facility] with frequent falls.  Likely due to acute infection.  Telemetry unremarkable.  Orthostatics negative, but checked after IVF hydration.    Seen in the ED on 11/1 with laceration repair to the right scalp with staples and right eyebrow repair with sutures.  CT head with scalp hematomas and c/s was negative and discharge back to [facility].    Seen in the ED 11/5 am after a mechanical fall with laceration to her left parietal scalp with 2 staples placed and discharge back to her [facility].  CT  "head and C/S with no acute abnormality.  Patient returned to the ED again on 11/5 [facility] with generalized weakness and found to have a low grade fever of 100.1.  A Laceration of the left posterior scalp was repaired with staples.  - PT consulted and recommend TCU  - SW consult for assistance with placement  - right eyebrow sutures removed on 11/9   - right scalp staples to be removed around 11/11  - left scalp staples to be removed around 11/15     Parkinson's Disease  Cognitive Impairment - parkinson's disease diagnosed 10/2018.  Followed by Neurology through [facility].  Has baseline significant gait instability, orofacial dyskinesia and hallucinations.  SLUMS score 1/2022 was 21/30.  - continue pta Sinemet, Robinul and prn Seroquel.      Troponin and CK elevation - minimally elevated, likely related to falls.  Trop flat.  No chest pain.  CK now normal after IVF hydration.     Hx Chronic Neck Pain - h/o cervical spondylosis s/p cervical fusion.  CT c/s with no acute abnormalities.    - continue pta scheduled Tylenol and pta Robaxin prn was increased to tid     Hx Cdiff - hx of cdiff in 2021.  No recent diarrhea.  Discussed with ID and no prophylactic vanco is needed.        CODE: full  DVT: scd  GI: pepcid  Diet/fluids: regular  Disposition:  TCU     Deborah De Leon MS, PA-C  Hospitalist Physician Assistant  [facility]      Subjective & Interval Hx:    Patient reports she feels improved since admission.  Still has some neck pain and upper back pain today.  Denies any chest pain, shortness of breath, abdominal pain.  Tolerating po.  Denies any hallucinations      Last 24 hr care team notes reviewed.   ROS:  4 point ROS including Respiratory, CV, GI and , other than that noted in the HPI, is negative.    Physical Exam:    Blood pressure (!) 147/74, pulse 82, temperature 97.8  F (36.6  C), temperature source Oral, resp. rate 16, height 1.549 m (5' 0.98\"), weight 51.7 kg " (114 lb), SpO2 94 %.  General: Alert, interactive, NAD  HEENT: sclera anicteric, PERRL, right eye ecchymosis almost nearly resolved, right eyebrow injury is well healed. Staples to head x3 areas from recent falls  Resp: clear to auscultation bilaterally, no crackles or wheezes  Cardiac: regular rate and rhythm, no murmur  Abdomen: Soft, nontender, nondistended. +BS.  No HSM or masses, no rebound or guarding.  Extremities: No LE edema  Skin: Warm and dry  Neuro: Alert & oriented x 3, moves all extremities equally       Labs & Images:  Reviewed in Epic   Medications:    Current Facility-Administered Medications   Medication     acetaminophen (TYLENOL) tablet 650 mg    Or     acetaminophen (TYLENOL) Suppository 650 mg     acetaminophen (TYLENOL) tablet 1,000 mg     carbidopa-levodopa (SINEMET CR)  MG per CR tablet 2 tablet     carbidopa-levodopa (SINEMET)  MG per tablet 1 tablet    And     carbidopa-levodopa half-tab 12.5-50 mg     carbidopa-levodopa (SINEMET)  MG per tablet 2 tablet     cefTRIAXone (ROCEPHIN) 2 g vial to attach to  ml bag for ADULTS or NS 50 ml bag for PEDS     enoxaparin ANTICOAGULANT (LOVENOX) injection 40 mg     famotidine (PEPCID) tablet 20 mg     Give   of usual dose of LONG ACTING insulin AM of procedure IF diabetic     glycopyrrolate (ROBINUL) tablet 1 mg     glycopyrrolate (ROBINUL) tablet 1 mg     HOLD: Insulin - RAPID/SHORT acting AM of procedure IF diabetic     HOLD: Insulin - REGULAR AM of procedure IF diabetic     HOLD: Oral hypoglycemics AM of procedure IF diabetic     lidocaine (LMX4) cream     lidocaine 1 % 1 mL     May take oral meds with a sip of water, the morning of LEN procedure.     melatonin tablet 1 mg     methocarbamol (ROBAXIN) half-tab 250 mg     ondansetron (ZOFRAN ODT) ODT tab 4 mg    Or     ondansetron (ZOFRAN) injection 4 mg     QUEtiapine (SEROquel) half-tab 12.5 mg     senna-docusate (SENOKOT-S/PERICOLACE) 8.6-50 MG per tablet 1 tablet      sodium chloride (PF) 0.9% PF flush 3 mL     sodium chloride (PF) 0.9% PF flush 3 mL     sodium chloride (PF) 0.9% PF flush 3 mL     sodium chloride (PF) 0.9% PF flush 3 mL     sodium chloride 0.9% infusion        No

## 2023-01-23 ENCOUNTER — APPOINTMENT (OUTPATIENT)
Dept: CV DIAGNOSITCS | Facility: HOSPITAL | Age: 63
End: 2023-01-23
Payer: COMMERCIAL

## 2023-01-23 ENCOUNTER — OUTPATIENT (OUTPATIENT)
Dept: OUTPATIENT SERVICES | Facility: HOSPITAL | Age: 63
LOS: 1 days | End: 2023-01-23

## 2023-01-23 DIAGNOSIS — I10 ESSENTIAL (PRIMARY) HYPERTENSION: ICD-10-CM

## 2023-01-23 DIAGNOSIS — I42.9 CARDIOMYOPATHY, UNSPECIFIED: ICD-10-CM

## 2023-01-23 DIAGNOSIS — Z98.890 OTHER SPECIFIED POSTPROCEDURAL STATES: Chronic | ICD-10-CM

## 2023-01-23 PROCEDURE — 93306 TTE W/DOPPLER COMPLETE: CPT | Mod: 26

## 2023-01-27 ENCOUNTER — NON-APPOINTMENT (OUTPATIENT)
Age: 63
End: 2023-01-27

## 2023-01-27 ENCOUNTER — APPOINTMENT (OUTPATIENT)
Dept: ELECTROPHYSIOLOGY | Facility: CLINIC | Age: 63
End: 2023-01-27
Payer: COMMERCIAL

## 2023-01-27 PROCEDURE — 93294 REM INTERROG EVL PM/LDLS PM: CPT

## 2023-01-27 PROCEDURE — 93296 REM INTERROG EVL PM/IDS: CPT

## 2023-04-28 ENCOUNTER — NON-APPOINTMENT (OUTPATIENT)
Age: 63
End: 2023-04-28

## 2023-04-28 ENCOUNTER — APPOINTMENT (OUTPATIENT)
Dept: ELECTROPHYSIOLOGY | Facility: CLINIC | Age: 63
End: 2023-04-28
Payer: COMMERCIAL

## 2023-04-28 PROCEDURE — 93294 REM INTERROG EVL PM/LDLS PM: CPT

## 2023-04-28 PROCEDURE — 93296 REM INTERROG EVL PM/IDS: CPT

## 2023-06-16 ENCOUNTER — APPOINTMENT (OUTPATIENT)
Dept: CARDIOLOGY | Facility: CLINIC | Age: 63
End: 2023-06-16
Payer: COMMERCIAL

## 2023-06-16 ENCOUNTER — NON-APPOINTMENT (OUTPATIENT)
Age: 63
End: 2023-06-16

## 2023-06-16 VITALS
DIASTOLIC BLOOD PRESSURE: 70 MMHG | TEMPERATURE: 98.1 F | HEART RATE: 69 BPM | HEIGHT: 62 IN | BODY MASS INDEX: 25.76 KG/M2 | WEIGHT: 140 LBS | SYSTOLIC BLOOD PRESSURE: 133 MMHG | OXYGEN SATURATION: 100 %

## 2023-06-16 DIAGNOSIS — U07.1 COVID-19: ICD-10-CM

## 2023-06-16 DIAGNOSIS — E03.9 HYPOTHYROIDISM, UNSPECIFIED: ICD-10-CM

## 2023-06-16 PROCEDURE — 99214 OFFICE O/P EST MOD 30 MIN: CPT | Mod: 25

## 2023-06-16 PROCEDURE — 93000 ELECTROCARDIOGRAM COMPLETE: CPT

## 2023-06-22 PROBLEM — E03.9 HYPOTHYROIDISM: Status: ACTIVE | Noted: 2020-08-17

## 2023-06-22 PROBLEM — U07.1 COVID-19 VIRUS INFECTION: Status: RESOLVED | Noted: 2023-06-22 | Resolved: 2023-06-22

## 2023-06-22 NOTE — HISTORY OF PRESENT ILLNESS
[FreeTextEntry1] : Ms. Borjas presents to the office today for a follow up visit.\par \par Ms. Borjas is a 63 year old female with a medical history significant for hypertension, left bundle branch block, 3rd degree AV block (s/p implantation of a biventricular pacemaker in August 2020 - see below), hyperlipidemia, thyroid cancer (s/p partial thyroidectomy in 2014), and iatrogenic hypothyroidism.  Of note is that Ms. Borjas did not undergo radiation therapy or chemotherapy following her thyroidectomy.  \par \par Ms. Borjas was admitted to [facility] on August 3rd, 2020 with fatigue, dyspnea, and nausea.  An electrocardiogram demonstrated sinus rhythm with 3rd degree AV block and a wide complex escape rhythm at 29/minute.  A transvenous pacemaker was inserted in the Emergency Department and Ms. Borjas was subsequently transferred to the Cardiac Intensive Care Unit.\par \par A transthoracic echocardiogram performed on August 3rd, 2020 demonstrated mild segmental left ventricular systolic dysfunction, with hypokinesis of the inferior wall (LVEF 50%).  Right ventricular size and systolic function were normal.  There was mild mitral regurgitation and the left atrium was found to be mildly dilated.\par \par Ms. Borjas underwent cardiac catheterization on August 3rd, 2020.  In summary, coronary angiography demonstrated a co-dominant coronary circulation.  Coronary angiography demonstrated normal left main, left anterior descending, left circumflex, and right coronary arteries.\par \par On August 3rd, 2020, Ms. Borjas underwent insertion of a Medtronic biventricular pacemaker device.  The procedure went well and was without complications.  Ms. Borjas was subsequently started on carvedilol 3.125 mg BID due to the mild LV systolic dysfunction noted on echocardiography.  She was subsequently discharged from the hospital on August 4th, 2020.\par \par Ms. Borjas's previous (from 9/16/2020) blood work was reviewed.  In summary, a metabolic panel demonstrated a serum sodium of 139 mmol/L, serum potassium 4.0 mmol/L, and a serum creatinine of 1.0 mg/dL.  A lipid profile demonstrated a total cholesterol of 235 mg/dL, triglycerides 99 mg/dL, HDL 66 mg/dL, and  mg/dL.  The serum free T4 and the serum thyroglobulin levels were normal.\par \par Ms. Borjas's most recent transthoracic echocardiogram was performed at [facility] on January 23rd, 2023.  In summary, this demonstrated normal LV and RV systolic function, with no regional wall motion abnormalities (LVEF 65%).  There was mild aortic regurgitation.  The aortic valve is trileaflet with normal opening.\par \par Ms. Borjas's most recent permanent pacemaker interrogation took place remotely on April 28th, 2023.  In summary, this demonstrated that all measured data were within normal limits.  There were no events for review.  It is estimated that 8.4 years remain until the device reaches the elective replacement indicator (AMY).\par \par Ms. Borjas reports that she has been feeling well since her last office visit.  She has been able to walk without difficulty or limitation and denies having any chest pain or dyspnea either at rest or with exertion.  In addition, there has been no palpitations, presyncope, or syncope.  Ms. Borjas denies having any orthopnea, paroxysmal nocturnal dyspnea, or edema.  Of note is that Ms. Borjas's atorvastatin dose was recently decreased from 20 mg/day to 10 mg/day due to leg cramps that she had been experiencing.  Ms. Borjas reports that the leg cramps have resolved with the decreased atorvastatin dose.\par \par

## 2023-06-22 NOTE — DISCUSSION/SUMMARY
[EKG obtained to assist in diagnosis and management of assessed problem(s)] : EKG obtained to assist in diagnosis and management of assessed problem(s) [FreeTextEntry1] : In summary, Ms. Borjas appears to be doing well from a cardiac standpoint.  She continues to maintain a very good functional capacity and has not had any recent symptoms suggestive of angina or congestive heart failure.  In addition, her blood pressure is under good control on her current medical regimen.  Furthermore, her recent transthoracic echocardiogram demonstrates normal LV and RV systolic function.\par \par I encouraged Ms. Borjas to continue to exercise as tolerated and advised her to continue with her current medical regimen.  She reports that she is scheduled to have baseline lab work, including a follow up lipid profile, performed in Primary Care in the near future.  She will follow up with me in approximately six months, at which time we will obtain a follow up transthoracic echocardiogram.\par \par Again, I would like to thank you for the privilege of participating in the care of your patient.  I will be certain to be in touch with you again following Ms. Borjas's next office visit.

## 2023-07-31 ENCOUNTER — APPOINTMENT (OUTPATIENT)
Dept: ELECTROPHYSIOLOGY | Facility: CLINIC | Age: 63
End: 2023-07-31
Payer: COMMERCIAL

## 2023-07-31 ENCOUNTER — NON-APPOINTMENT (OUTPATIENT)
Age: 63
End: 2023-07-31

## 2023-07-31 PROCEDURE — 93296 REM INTERROG EVL PM/IDS: CPT

## 2023-07-31 PROCEDURE — 93294 REM INTERROG EVL PM/LDLS PM: CPT

## 2023-09-07 ENCOUNTER — RX RENEWAL (OUTPATIENT)
Age: 63
End: 2023-09-07

## 2023-09-07 RX ORDER — AMLODIPINE BESYLATE 2.5 MG/1
2.5 TABLET ORAL
Qty: 90 | Refills: 3 | Status: ACTIVE | COMMUNITY
Start: 2023-09-07 | End: 1900-01-01

## 2023-10-30 ENCOUNTER — NON-APPOINTMENT (OUTPATIENT)
Age: 63
End: 2023-10-30

## 2023-10-30 ENCOUNTER — APPOINTMENT (OUTPATIENT)
Dept: ELECTROPHYSIOLOGY | Facility: CLINIC | Age: 63
End: 2023-10-30
Payer: COMMERCIAL

## 2023-10-30 PROCEDURE — 93296 REM INTERROG EVL PM/IDS: CPT

## 2023-10-30 PROCEDURE — 93294 REM INTERROG EVL PM/LDLS PM: CPT

## 2023-12-07 NOTE — ED PROVIDER NOTE - NEUROLOGICAL, MLM
Glucose 187. Patient does not want to take the full dose of Lantus due to glucose possibly dropping too low in the middle of the night. This RN will perfect serve hospital Dr. to verify dose.       Amber Cantrell RN  12/06/23 2043 Alert and oriented, no focal deficits, no motor or sensory deficits.

## 2024-01-29 ENCOUNTER — NON-APPOINTMENT (OUTPATIENT)
Age: 64
End: 2024-01-29

## 2024-01-29 ENCOUNTER — APPOINTMENT (OUTPATIENT)
Dept: ELECTROPHYSIOLOGY | Facility: CLINIC | Age: 64
End: 2024-01-29
Payer: COMMERCIAL

## 2024-01-29 PROCEDURE — 93296 REM INTERROG EVL PM/IDS: CPT

## 2024-01-29 PROCEDURE — 93294 REM INTERROG EVL PM/LDLS PM: CPT

## 2024-02-16 ENCOUNTER — NON-APPOINTMENT (OUTPATIENT)
Age: 64
End: 2024-02-16

## 2024-02-16 ENCOUNTER — APPOINTMENT (OUTPATIENT)
Dept: CV DIAGNOSITCS | Facility: HOSPITAL | Age: 64
End: 2024-02-16

## 2024-02-16 ENCOUNTER — APPOINTMENT (OUTPATIENT)
Dept: CARDIOLOGY | Facility: CLINIC | Age: 64
End: 2024-02-16
Payer: COMMERCIAL

## 2024-02-16 ENCOUNTER — OUTPATIENT (OUTPATIENT)
Dept: OUTPATIENT SERVICES | Facility: HOSPITAL | Age: 64
LOS: 1 days | End: 2024-02-16
Payer: COMMERCIAL

## 2024-02-16 ENCOUNTER — RESULT REVIEW (OUTPATIENT)
Age: 64
End: 2024-02-16

## 2024-02-16 VITALS
HEART RATE: 85 BPM | DIASTOLIC BLOOD PRESSURE: 85 MMHG | HEIGHT: 62 IN | BODY MASS INDEX: 25.76 KG/M2 | WEIGHT: 140 LBS | SYSTOLIC BLOOD PRESSURE: 138 MMHG | OXYGEN SATURATION: 100 %

## 2024-02-16 DIAGNOSIS — I47.10 SUPRAVENTRICULAR TACHYCARDIA, UNSPECIFIED: ICD-10-CM

## 2024-02-16 DIAGNOSIS — I44.2 ATRIOVENTRICULAR BLOCK, COMPLETE: ICD-10-CM

## 2024-02-16 DIAGNOSIS — I10 ESSENTIAL (PRIMARY) HYPERTENSION: ICD-10-CM

## 2024-02-16 DIAGNOSIS — I42.9 CARDIOMYOPATHY, UNSPECIFIED: ICD-10-CM

## 2024-02-16 DIAGNOSIS — E78.5 HYPERLIPIDEMIA, UNSPECIFIED: ICD-10-CM

## 2024-02-16 DIAGNOSIS — Z98.890 OTHER SPECIFIED POSTPROCEDURAL STATES: Chronic | ICD-10-CM

## 2024-02-16 PROCEDURE — 93306 TTE W/DOPPLER COMPLETE: CPT | Mod: 26

## 2024-02-16 PROCEDURE — 99214 OFFICE O/P EST MOD 30 MIN: CPT | Mod: 25

## 2024-02-16 PROCEDURE — 93000 ELECTROCARDIOGRAM COMPLETE: CPT

## 2024-02-16 RX ORDER — ATORVASTATIN CALCIUM 10 MG/1
10 TABLET, FILM COATED ORAL
Qty: 90 | Refills: 3 | Status: ACTIVE | COMMUNITY
Start: 2022-04-14 | End: 1900-01-01

## 2024-02-18 PROBLEM — I47.10 SUPRAVENTRICULAR TACHYCARDIA: Status: ACTIVE | Noted: 2022-01-29

## 2024-02-18 PROBLEM — I42.9 CARDIOMYOPATHY: Status: ACTIVE | Noted: 2020-11-07

## 2024-02-18 PROBLEM — I44.2 AV BLOCK, 3RD DEGREE: Status: ACTIVE | Noted: 2020-08-17

## 2024-02-18 PROBLEM — E78.5 HYPERLIPIDEMIA: Status: ACTIVE | Noted: 2022-08-07

## 2024-02-18 PROBLEM — I10 HYPERTENSION: Status: ACTIVE | Noted: 2020-08-17

## 2024-02-18 NOTE — DISCUSSION/SUMMARY
[EKG obtained to assist in diagnosis and management of assessed problem(s)] : EKG obtained to assist in diagnosis and management of assessed problem(s) [FreeTextEntry1] : Of note is that Ms. Borjas had a follow up transthoracic echocardiogram performed at the time of today's office visit.  In summary, this demonstrated normal LV and RV systolic function, with no regional wall motion abnormalities (LVEF 69%).  There was mild mitral regurgitation.  The aortic valve is calcified and trileaflet with normal opening.  There was mild to moderate aortic regurgitation.  In summary, compared to the previous echocardiogram of 1/23/2023, slightly more aortic regurgitation was noted on the current study.  In summary, Ms. Borjas appears to be doing well from a cardiac standpoint.  She continues to maintain a good functional capacity and has not had any recent symptoms suggestive of angina or congestive heart failure.  In addition, today's follow up transthoracic echocardiogram demonstrates normal LV and RV systolic function.  Ms. Borjas will continue to be monitored with serial echocardiograms in order to re-evaluate the mitral regurgitation and aortic regurgitation.  In the meantime she was advised to continue with her current medical regimen.  Ms. Borjas will follow up in the office in approximately six months, or sooner as necessary.  Again, I would like to thank you for the privilege of participating in the care of your patient.  I will be certain to be in touch with you again following Ms. Borjas's next office visit.

## 2024-02-18 NOTE — HISTORY OF PRESENT ILLNESS
[FreeTextEntry1] : Ms. Borjas presents to the office today for a follow up visit.  Ms. Borjas is a 63 year old female with a medical history significant for hypertension, left bundle branch block, 3rd degree AV block (s/p implantation of a biventricular pacemaker in August 2020 - see below), hyperlipidemia, thyroid cancer (s/p partial thyroidectomy in 2014), and iatrogenic hypothyroidism.  Of note is that Ms. Borjas did not undergo radiation therapy or chemotherapy following her thyroidectomy.    Ms. Borjas was admitted to [facility] on August 3rd, 2020 with fatigue, dyspnea, and nausea.  An electrocardiogram demonstrated sinus rhythm with 3rd degree AV block and a wide complex escape rhythm at 29/minute.  A transvenous pacemaker was inserted in the Emergency Department and Ms. Borjas was subsequently transferred to the Cardiac Intensive Care Unit.  A transthoracic echocardiogram performed on August 3rd, 2020 demonstrated mild segmental left ventricular systolic dysfunction, with hypokinesis of the inferior wall (LVEF 50%).  Right ventricular size and systolic function were normal.  There was mild mitral regurgitation and the left atrium was found to be mildly dilated.  Ms. Borjas underwent cardiac catheterization on August 3rd, 2020.  In summary, coronary angiography demonstrated a co-dominant coronary circulation.  Coronary angiography demonstrated normal left main, left anterior descending, left circumflex, and right coronary arteries.  On August 3rd, 2020, Ms. Borjas underwent insertion of a Medtronic biventricular pacemaker device.  The procedure went well and was without complications.  Ms. Borjas was subsequently started on carvedilol 3.125 mg BID due to the mild LV systolic dysfunction noted on echocardiography.  She was subsequently discharged from the hospital on August 4th, 2020.  Ms. Borjas's most recent transthoracic echocardiogram was performed at [facility] on January 23rd, 2023.  In summary, this demonstrated normal LV and RV systolic function, with no regional wall motion abnormalities (LVEF 65%).  There was mild aortic regurgitation.  The aortic valve is trileaflet with normal opening.  Ms. Borjas's most recent permanent pacemaker interrogation took place remotely on January 29th, 2024.  In summary, this demonstrated that all measured data were within normal limits.  There were no arrhythmic events for review.  It is estimated that 7.8 years remain until the device reaches the elective replacement indicator (AMY).  Ms. Borjas reports that she has been feeling well since her last office visit.  She continues to ambulate without difficulty and denies having any chest pain or dyspnea either at rest or with exertion.  In addition, there have been no palpitations, presyncope, or syncope.  Ms. Borjas denies having any orthopnea, paroxysmal nocturnal dyspnea, or edema.

## 2024-04-28 ENCOUNTER — NON-APPOINTMENT (OUTPATIENT)
Age: 64
End: 2024-04-28

## 2024-04-29 ENCOUNTER — APPOINTMENT (OUTPATIENT)
Dept: ELECTROPHYSIOLOGY | Facility: CLINIC | Age: 64
End: 2024-04-29
Payer: COMMERCIAL

## 2024-04-29 PROCEDURE — 93296 REM INTERROG EVL PM/IDS: CPT

## 2024-04-29 PROCEDURE — 93294 REM INTERROG EVL PM/LDLS PM: CPT

## 2024-07-29 ENCOUNTER — NON-APPOINTMENT (OUTPATIENT)
Age: 64
End: 2024-07-29

## 2024-07-29 ENCOUNTER — APPOINTMENT (OUTPATIENT)
Dept: ELECTROPHYSIOLOGY | Facility: CLINIC | Age: 64
End: 2024-07-29
Payer: COMMERCIAL

## 2024-07-29 PROCEDURE — 93294 REM INTERROG EVL PM/LDLS PM: CPT

## 2024-07-29 PROCEDURE — 93296 REM INTERROG EVL PM/IDS: CPT

## 2024-09-02 ENCOUNTER — RX RENEWAL (OUTPATIENT)
Age: 64
End: 2024-09-02

## 2024-09-13 ENCOUNTER — APPOINTMENT (OUTPATIENT)
Dept: CARDIOLOGY | Facility: CLINIC | Age: 64
End: 2024-09-13
Payer: COMMERCIAL

## 2024-09-13 ENCOUNTER — NON-APPOINTMENT (OUTPATIENT)
Age: 64
End: 2024-09-13

## 2024-09-13 VITALS
HEART RATE: 75 BPM | WEIGHT: 146.8 LBS | DIASTOLIC BLOOD PRESSURE: 75 MMHG | SYSTOLIC BLOOD PRESSURE: 123 MMHG | BODY MASS INDEX: 27.02 KG/M2 | HEIGHT: 62 IN | OXYGEN SATURATION: 98 %

## 2024-09-13 DIAGNOSIS — I35.1 NONRHEUMATIC AORTIC (VALVE) INSUFFICIENCY: ICD-10-CM

## 2024-09-13 DIAGNOSIS — I34.0 NONRHEUMATIC MITRAL (VALVE) INSUFFICIENCY: ICD-10-CM

## 2024-09-13 DIAGNOSIS — E78.5 HYPERLIPIDEMIA, UNSPECIFIED: ICD-10-CM

## 2024-09-13 DIAGNOSIS — I10 ESSENTIAL (PRIMARY) HYPERTENSION: ICD-10-CM

## 2024-09-13 PROCEDURE — G2211 COMPLEX E/M VISIT ADD ON: CPT | Mod: NC

## 2024-09-13 PROCEDURE — 93000 ELECTROCARDIOGRAM COMPLETE: CPT

## 2024-09-13 PROCEDURE — 99214 OFFICE O/P EST MOD 30 MIN: CPT | Mod: 25

## 2024-09-15 PROBLEM — I34.0 MITRAL VALVE REGURGITATION: Status: ACTIVE | Noted: 2024-09-15

## 2024-09-15 PROBLEM — I34.0 NONRHEUMATIC MITRAL VALVE REGURGITATION: Status: RESOLVED | Noted: 2024-09-15 | Resolved: 2024-09-15

## 2024-09-15 PROBLEM — I35.1 NONRHEUMATIC AORTIC VALVE REGURGITATION: Status: ACTIVE | Noted: 2024-09-15

## 2024-09-15 PROBLEM — I35.1 AORTIC VALVE REGURGITATION, NONRHEUMATIC: Status: RESOLVED | Noted: 2024-09-15 | Resolved: 2024-09-15

## 2024-09-15 NOTE — DISCUSSION/SUMMARY
[EKG obtained to assist in diagnosis and management of assessed problem(s)] : EKG obtained to assist in diagnosis and management of assessed problem(s) [FreeTextEntry1] : In summary, Ms. Borjas appears to be doing very well from a cardiac standpoint.  She continues to maintain a very good functional capacity and has had no recent symptoms suggestive of angina or congestive heart failure.  In addition, Ms. Borjas's blood pressure is under very good control on her current medical regimen.  Ms. Borjas was encouraged to continue to exercise as tolerated.  She was also advised to continue with her current medical regimen, including amlodipine 2.5 mg/day, atorvastatin 10 mg/day, and ramipril 10 mg/day.  Ms. Borjas will return to the office in approximately six months, at which time we will obtain a follow up transthoracic echocardiogram in order to monitor the LV systolic function, mitral regurgitation, and aortic regurgitation.  Again, I would like to thank you for the privilege of participating in the care of your patient.  I will be certain to be in touch with you again following Ms. Borjas's next office visit.

## 2024-09-15 NOTE — HISTORY OF PRESENT ILLNESS
[FreeTextEntry1] : Ms. Borjas presents to the office today for a follow up visit.  Ms. Borjas is a 64 year old female with a medical history significant for hypertension, left bundle branch block, 3rd degree AV block (s/p implantation of a biventricular pacemaker in August 2020 - see below), hyperlipidemia, thyroid cancer (s/p partial thyroidectomy in 2014), and iatrogenic hypothyroidism.  Of note is that Ms. Borjas did not undergo radiation therapy or chemotherapy following her thyroidectomy.    Ms. Borjas was admitted to [facility] on August 3rd, 2020 with fatigue, dyspnea, and nausea.  An electrocardiogram demonstrated sinus rhythm with 3rd degree AV block and a wide complex escape rhythm at 29/minute.  A transvenous pacemaker was inserted in the Emergency Department and Ms. Brojas was subsequently transferred to the Cardiac Intensive Care Unit.  A transthoracic echocardiogram performed on August 3rd, 2020 demonstrated mild segmental left ventricular systolic dysfunction, with hypokinesis of the inferior wall (LVEF 50%).  Right ventricular size and systolic function were normal.  There was mild mitral regurgitation and the left atrium was found to be mildly dilated.  Ms. Borjas underwent cardiac catheterization on August 3rd, 2020.  In summary, coronary angiography demonstrated a co-dominant coronary circulation.  Coronary angiography demonstrated normal left main, left anterior descending, left circumflex, and right coronary arteries.  On August 3rd, 2020, Ms. Borjas underwent insertion of a Medtronic biventricular pacemaker device.  The procedure went well and was without complications.  Ms. Borjas was subsequently started on carvedilol 3.125 mg BID due to the mild LV systolic dysfunction noted on echocardiography.  She was subsequently discharged from the hospital on August 4th, 2020.  Ms. Borjas's most recent transthoracic echocardiogram was performed at [facility] on February 16th, 2024.  In summary, this demonstrated normal LV and RV systolic function, with no regional wall motion abnormalities (LVEF 69%).  There was mild mitral regurgitation.  The aortic valve is calcified and trileaflet with normal opening.  There was mild to moderate aortic regurgitation.  In summary, compared to the previous echocardiogram of 1/23/2023, slightly more aortic regurgitation was noted on the current study.  Ms. Borjas's most recent permanent biventricular pacemaker interrogation took place remotely on July 29th, 2024.  In summary, this demonstrated that all measured data were within normal limits.  There were no events for review.  Ms. Borjas reports that she has been feeling well since her last office visit.  She continues to exercise without difficulty or limitation and denies having any chest pain or dyspnea either at rest or with exertion.  In addition, there have been no palpitations, presyncope, or syncope.  Ms. Borjas denies having any orthopnea, paroxysmal nocturnal dyspnea, or edema.

## 2024-10-30 ENCOUNTER — NON-APPOINTMENT (OUTPATIENT)
Age: 64
End: 2024-10-30

## 2024-10-30 ENCOUNTER — APPOINTMENT (OUTPATIENT)
Dept: ELECTROPHYSIOLOGY | Facility: CLINIC | Age: 64
End: 2024-10-30
Payer: COMMERCIAL

## 2024-10-30 PROCEDURE — 93294 REM INTERROG EVL PM/LDLS PM: CPT

## 2024-10-30 PROCEDURE — 93296 REM INTERROG EVL PM/IDS: CPT

## 2025-01-30 ENCOUNTER — APPOINTMENT (OUTPATIENT)
Dept: ELECTROPHYSIOLOGY | Facility: CLINIC | Age: 65
End: 2025-01-30
Payer: COMMERCIAL

## 2025-01-30 ENCOUNTER — NON-APPOINTMENT (OUTPATIENT)
Age: 65
End: 2025-01-30

## 2025-01-30 PROCEDURE — 93294 REM INTERROG EVL PM/LDLS PM: CPT

## 2025-01-30 PROCEDURE — 93296 REM INTERROG EVL PM/IDS: CPT

## 2025-04-04 ENCOUNTER — APPOINTMENT (OUTPATIENT)
Dept: CARDIOLOGY | Facility: CLINIC | Age: 65
End: 2025-04-04
Payer: COMMERCIAL

## 2025-04-04 ENCOUNTER — NON-APPOINTMENT (OUTPATIENT)
Age: 65
End: 2025-04-04

## 2025-04-04 VITALS
SYSTOLIC BLOOD PRESSURE: 131 MMHG | BODY MASS INDEX: 24.84 KG/M2 | HEART RATE: 84 BPM | OXYGEN SATURATION: 100 % | WEIGHT: 135 LBS | HEIGHT: 62 IN | TEMPERATURE: 97.9 F | DIASTOLIC BLOOD PRESSURE: 80 MMHG

## 2025-04-04 VITALS — DIASTOLIC BLOOD PRESSURE: 72 MMHG | SYSTOLIC BLOOD PRESSURE: 121 MMHG

## 2025-04-04 DIAGNOSIS — E78.5 HYPERLIPIDEMIA, UNSPECIFIED: ICD-10-CM

## 2025-04-04 DIAGNOSIS — I42.9 CARDIOMYOPATHY, UNSPECIFIED: ICD-10-CM

## 2025-04-04 DIAGNOSIS — I35.1 NONRHEUMATIC AORTIC (VALVE) INSUFFICIENCY: ICD-10-CM

## 2025-04-04 DIAGNOSIS — I34.0 NONRHEUMATIC MITRAL (VALVE) INSUFFICIENCY: ICD-10-CM

## 2025-04-04 DIAGNOSIS — I44.2 ATRIOVENTRICULAR BLOCK, COMPLETE: ICD-10-CM

## 2025-04-04 DIAGNOSIS — I47.10 SUPRAVENTRICULAR TACHYCARDIA, UNSPECIFIED: ICD-10-CM

## 2025-04-04 DIAGNOSIS — I10 ESSENTIAL (PRIMARY) HYPERTENSION: ICD-10-CM

## 2025-04-04 PROCEDURE — 93000 ELECTROCARDIOGRAM COMPLETE: CPT

## 2025-04-04 PROCEDURE — G2211 COMPLEX E/M VISIT ADD ON: CPT | Mod: NC

## 2025-04-04 PROCEDURE — 99214 OFFICE O/P EST MOD 30 MIN: CPT

## 2025-05-01 ENCOUNTER — APPOINTMENT (OUTPATIENT)
Dept: ELECTROPHYSIOLOGY | Facility: CLINIC | Age: 65
End: 2025-05-01
Payer: COMMERCIAL

## 2025-05-01 ENCOUNTER — NON-APPOINTMENT (OUTPATIENT)
Age: 65
End: 2025-05-01

## 2025-05-01 PROCEDURE — 93296 REM INTERROG EVL PM/IDS: CPT

## 2025-05-01 PROCEDURE — 93294 REM INTERROG EVL PM/LDLS PM: CPT

## 2025-05-05 ENCOUNTER — RX RENEWAL (OUTPATIENT)
Age: 65
End: 2025-05-05

## 2025-05-16 ENCOUNTER — RESULT REVIEW (OUTPATIENT)
Age: 65
End: 2025-05-16

## 2025-05-16 ENCOUNTER — APPOINTMENT (OUTPATIENT)
Dept: CV DIAGNOSITCS | Facility: HOSPITAL | Age: 65
End: 2025-05-16

## 2025-05-16 ENCOUNTER — OUTPATIENT (OUTPATIENT)
Dept: OUTPATIENT SERVICES | Facility: HOSPITAL | Age: 65
LOS: 1 days | End: 2025-05-16
Payer: COMMERCIAL

## 2025-05-16 DIAGNOSIS — Z98.890 OTHER SPECIFIED POSTPROCEDURAL STATES: Chronic | ICD-10-CM

## 2025-05-16 DIAGNOSIS — Z86.79 PERSONAL HISTORY OF OTHER DISEASES OF THE CIRCULATORY SYSTEM: ICD-10-CM

## 2025-05-16 PROCEDURE — 93306 TTE W/DOPPLER COMPLETE: CPT | Mod: 26

## 2025-06-19 NOTE — HISTORY OF PRESENT ILLNESS
oriented to person, place and time , normal sensation , short and long term memory intact [FreeTextEntry1] : Ms. Borjas presents to the office today for a follow up visit.\par \par Ms. Borjas is a 60 year old female with a medical history significant for hypertension, left bundle branch block, 3rd degree AV block (s/p implantation of a biventricular pacemaker in August 2020 - see below), thyroid cancer (s/p partial thyroidectomy in 2014), and iatrogenic hypothyroidism.  Of note is that Ms. Borjas did not undergo radiation therapy or chemotherapy following her thyroidectomy.  \par \par Ms. Borjas was admitted to [facility] on August 3rd, 2020 with fatigue, dyspnea, and nausea.  An electrocardiogram demonstrated sinus rhythm with 3rd degree AV block and a wide complex escape rhythm at 29/minute.  A transvenous pacemaker was inserted in the Emergency Department and Ms. Borjas was subsequently transferred to the Cardiac Intensive Care Unit.\par \par A transthoracic echocardiogram performed on August 3rd, 2020 demonstrated mild segmental left ventricular systolic dysfunction, with hypokinesis of the inferior wall (LVEF 50%).  Right ventricular size and systolic function were normal.  There was mild mitral regurgitation and the left atrium was found to be mildly dilated.\par \par Ms. Borjas underwent cardiac catheterization on August 3rd, 2020.  In summary, coronary angiography demonstrated a co-dominant coronary circulation.  Coronary angiography demonstrated normal left main, left anterior descending, left circumflex, and right coronary arteries.\par \par On August 3rd, 2020, Ms. Borjas underwent insertion of a Medtronic biventricular pacemaker device.  The procedure went well and was without complications.  Ms. Borjas was subsequently started on carvedilol 3.125 mg BID due to the mild LV systolic dysfunction noted on echocardiography.  She was subsequently discharged from the hospital on August 4th, 2020.\par \par Ms. Borjas's most recent (from 9/16/2020) blood work was reviewed.  In summary, a metabolic panel demonstrated a serum sodium of 139 mmol/L, serum potassium 4.0 mmol/L, and a serum creatinine of 1.0 mg/dL.  A lipid profile demonstrated a total cholesterol of 235 mg/dL, triglycerides 99 mg/dL, HDL 66 mg/dL, and  mg/dL.  The serum free T4 and the serum thyroglobulin levels were normal.\par \par Ms. Borjas's most recent biventricular pacemaker interrogation took place on October 19th, 2020.  In summary, this demonstrated that the underlying rhythm was sinus bradycardia and the patient is not pacer dependent.  Interrogation demonstrated normal biventricular function with adequate pacing and sensing thresholds noted.  Stored data revealed an episode of T wave oversensing and far field R wave oversensing noted on October 6th, 2020.  The biventricular pacemaker was reprogrammed by adjusting the atrial sensitivity from 0.45 to 0.60 mV.  In addition, the post-ventricular atrial blanking (PVAB) period was extended to 200 msec in order to reduce T wave oversensing and far field R wave oversensing.\par \par At the time of her initial office visit (on 11/6/2020), Ms. Borjas's carvedilol was discontinued as she felt that the medication was causing a burning sensation in her eyes in addition to causing blurred vision.  Ms. Borjas notes that these symptoms have resolved now that she is no longer taking the carvedilol.  Ms. Borjas notes that her levothyroxine has also been discontinued.  She has been able to ambulate without difficulty and denies having any chest pain or dyspnea either at rest or with exertion.  Ms. Borjas experiences occasional palpitations but denies having any presyncope or syncope.  There has been no history of orthopnea, paroxysmal nocturnal dyspnea, or edema.

## 2025-07-31 ENCOUNTER — APPOINTMENT (OUTPATIENT)
Dept: ELECTROPHYSIOLOGY | Facility: CLINIC | Age: 65
End: 2025-07-31
Payer: COMMERCIAL

## 2025-07-31 ENCOUNTER — NON-APPOINTMENT (OUTPATIENT)
Age: 65
End: 2025-07-31

## 2025-07-31 PROCEDURE — 93296 REM INTERROG EVL PM/IDS: CPT

## 2025-07-31 PROCEDURE — 93294 REM INTERROG EVL PM/LDLS PM: CPT

## 2025-09-05 ENCOUNTER — APPOINTMENT (OUTPATIENT)
Dept: ELECTROPHYSIOLOGY | Facility: CLINIC | Age: 65
End: 2025-09-05
Payer: COMMERCIAL

## 2025-09-05 ENCOUNTER — NON-APPOINTMENT (OUTPATIENT)
Age: 65
End: 2025-09-05

## 2025-09-05 VITALS — SYSTOLIC BLOOD PRESSURE: 131 MMHG | HEART RATE: 67 BPM | DIASTOLIC BLOOD PRESSURE: 70 MMHG

## 2025-09-05 DIAGNOSIS — I44.2 ATRIOVENTRICULAR BLOCK, COMPLETE: ICD-10-CM

## 2025-09-05 PROCEDURE — 93281 PM DEVICE PROGR EVAL MULTI: CPT
